# Patient Record
Sex: MALE | Race: OTHER | NOT HISPANIC OR LATINO | URBAN - METROPOLITAN AREA
[De-identification: names, ages, dates, MRNs, and addresses within clinical notes are randomized per-mention and may not be internally consistent; named-entity substitution may affect disease eponyms.]

---

## 2017-07-22 ENCOUNTER — INPATIENT (INPATIENT)
Facility: HOSPITAL | Age: 82
LOS: 2 days | Discharge: HOME | DRG: 86 | End: 2017-07-25
Attending: INTERNAL MEDICINE | Admitting: INTERNAL MEDICINE
Payer: MEDICARE

## 2017-07-22 ENCOUNTER — EMERGENCY (EMERGENCY)
Facility: HOSPITAL | Age: 82
LOS: 1 days | Discharge: ANOTHER HOSPITAL | End: 2017-07-22
Attending: EMERGENCY MEDICINE | Admitting: EMERGENCY MEDICINE
Payer: MEDICARE

## 2017-07-22 VITALS
DIASTOLIC BLOOD PRESSURE: 46 MMHG | OXYGEN SATURATION: 100 % | SYSTOLIC BLOOD PRESSURE: 102 MMHG | HEART RATE: 68 BPM | RESPIRATION RATE: 16 BRPM

## 2017-07-22 VITALS
TEMPERATURE: 98 F | SYSTOLIC BLOOD PRESSURE: 117 MMHG | WEIGHT: 130.07 LBS | DIASTOLIC BLOOD PRESSURE: 73 MMHG | OXYGEN SATURATION: 100 % | RESPIRATION RATE: 14 BRPM | HEART RATE: 67 BPM

## 2017-07-22 VITALS
SYSTOLIC BLOOD PRESSURE: 125 MMHG | TEMPERATURE: 97 F | HEART RATE: 80 BPM | RESPIRATION RATE: 18 BRPM | DIASTOLIC BLOOD PRESSURE: 62 MMHG | OXYGEN SATURATION: 99 %

## 2017-07-22 DIAGNOSIS — I10 ESSENTIAL (PRIMARY) HYPERTENSION: ICD-10-CM

## 2017-07-22 DIAGNOSIS — Z95.1 PRESENCE OF AORTOCORONARY BYPASS GRAFT: Chronic | ICD-10-CM
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
## 2017-07-22 DIAGNOSIS — E78.00 PURE HYPERCHOLESTEROLEMIA, UNSPECIFIED: ICD-10-CM

## 2017-07-22 DIAGNOSIS — R55 SYNCOPE AND COLLAPSE: ICD-10-CM

## 2017-07-22 DIAGNOSIS — I25.10 ATHEROSCLEROTIC HEART DISEASE OF NATIVE CORONARY ARTERY WITHOUT ANGINA PECTORIS: ICD-10-CM

## 2017-07-22 DIAGNOSIS — M79.652 PAIN IN LEFT THIGH: ICD-10-CM

## 2017-07-22 DIAGNOSIS — S06.5X0A TRAUMATIC SUBDURAL HEMORRHAGE WITHOUT LOSS OF CONSCIOUSNESS, INITIAL ENCOUNTER: ICD-10-CM

## 2017-07-22 DIAGNOSIS — Z79.82 LONG TERM (CURRENT) USE OF ASPIRIN: ICD-10-CM

## 2017-07-22 LAB
ABO RH CONFIRMATION: SIGNIFICANT CHANGE UP
ALBUMIN SERPL ELPH-MCNC: 3.5 G/DL — SIGNIFICANT CHANGE UP (ref 3.3–5)
ALBUMIN SERPL ELPH-MCNC: 4 G/DL — SIGNIFICANT CHANGE UP (ref 3.3–5)
ALP SERPL-CCNC: 81 U/L — SIGNIFICANT CHANGE UP (ref 40–120)
ALP SERPL-CCNC: 96 U/L — SIGNIFICANT CHANGE UP (ref 40–120)
ALT FLD-CCNC: 23 U/L RC — SIGNIFICANT CHANGE UP (ref 10–45)
ALT FLD-CCNC: 30 U/L — SIGNIFICANT CHANGE UP (ref 12–78)
ANION GAP SERPL CALC-SCNC: 10 MMOL/L — SIGNIFICANT CHANGE UP (ref 5–17)
ANION GAP SERPL CALC-SCNC: 5 MMOL/L — SIGNIFICANT CHANGE UP (ref 5–17)
APPEARANCE UR: CLEAR — SIGNIFICANT CHANGE UP
APTT BLD: 28 SEC — SIGNIFICANT CHANGE UP (ref 27.5–37.4)
AST SERPL-CCNC: 27 U/L — SIGNIFICANT CHANGE UP (ref 10–40)
AST SERPL-CCNC: 34 U/L — SIGNIFICANT CHANGE UP (ref 15–37)
BASOPHILS # BLD AUTO: 0 K/UL — SIGNIFICANT CHANGE UP (ref 0–0.2)
BASOPHILS # BLD AUTO: 0 K/UL — SIGNIFICANT CHANGE UP (ref 0–0.2)
BASOPHILS NFR BLD AUTO: 0.2 % — SIGNIFICANT CHANGE UP (ref 0–2)
BASOPHILS NFR BLD AUTO: 0.4 % — SIGNIFICANT CHANGE UP (ref 0–2)
BILIRUB SERPL-MCNC: 0.3 MG/DL — SIGNIFICANT CHANGE UP (ref 0.2–1.2)
BILIRUB SERPL-MCNC: 0.4 MG/DL — SIGNIFICANT CHANGE UP (ref 0.2–1.2)
BILIRUB UR-MCNC: NEGATIVE — SIGNIFICANT CHANGE UP
BLD GP AB SCN SERPL QL: SIGNIFICANT CHANGE UP
BUN SERPL-MCNC: 14 MG/DL — SIGNIFICANT CHANGE UP (ref 7–23)
BUN SERPL-MCNC: 14 MG/DL — SIGNIFICANT CHANGE UP (ref 7–23)
CALCIUM SERPL-MCNC: 8.8 MG/DL — SIGNIFICANT CHANGE UP (ref 8.5–10.1)
CALCIUM SERPL-MCNC: 9.2 MG/DL — SIGNIFICANT CHANGE UP (ref 8.4–10.5)
CHLORIDE SERPL-SCNC: 102 MMOL/L — SIGNIFICANT CHANGE UP (ref 96–108)
CHLORIDE SERPL-SCNC: 104 MMOL/L — SIGNIFICANT CHANGE UP (ref 96–108)
CK MB BLD-MCNC: 2.1 % — SIGNIFICANT CHANGE UP (ref 0–3.5)
CK MB CFR SERPL CALC: 1.5 NG/ML — SIGNIFICANT CHANGE UP (ref 0–3.6)
CK SERPL-CCNC: 70 U/L — SIGNIFICANT CHANGE UP (ref 26–308)
CO2 SERPL-SCNC: 23 MMOL/L — SIGNIFICANT CHANGE UP (ref 22–31)
CO2 SERPL-SCNC: 24 MMOL/L — SIGNIFICANT CHANGE UP (ref 22–31)
COLOR SPEC: YELLOW — SIGNIFICANT CHANGE UP
CREAT SERPL-MCNC: 0.99 MG/DL — SIGNIFICANT CHANGE UP (ref 0.5–1.3)
CREAT SERPL-MCNC: 1.2 MG/DL — SIGNIFICANT CHANGE UP (ref 0.5–1.3)
DIFF PNL FLD: ABNORMAL
EOSINOPHIL # BLD AUTO: 0.1 K/UL — SIGNIFICANT CHANGE UP (ref 0–0.5)
EOSINOPHIL # BLD AUTO: 0.2 K/UL — SIGNIFICANT CHANGE UP (ref 0–0.5)
EOSINOPHIL NFR BLD AUTO: 1.3 % — SIGNIFICANT CHANGE UP (ref 0–6)
EOSINOPHIL NFR BLD AUTO: 2.2 % — SIGNIFICANT CHANGE UP (ref 0–6)
GLUCOSE SERPL-MCNC: 118 MG/DL — HIGH (ref 70–99)
GLUCOSE SERPL-MCNC: 119 MG/DL — HIGH (ref 70–99)
GLUCOSE UR QL: NEGATIVE — SIGNIFICANT CHANGE UP
HCT VFR BLD CALC: 32.7 % — LOW (ref 39–50)
HCT VFR BLD CALC: 36 % — LOW (ref 39–50)
HGB BLD-MCNC: 11.3 G/DL — LOW (ref 13–17)
HGB BLD-MCNC: 12.6 G/DL — LOW (ref 13–17)
INR BLD: 1.13 RATIO — SIGNIFICANT CHANGE UP (ref 0.88–1.16)
KETONES UR-MCNC: NEGATIVE — SIGNIFICANT CHANGE UP
LEUKOCYTE ESTERASE UR-ACNC: NEGATIVE — SIGNIFICANT CHANGE UP
LYMPHOCYTES # BLD AUTO: 2.7 K/UL — SIGNIFICANT CHANGE UP (ref 1–3.3)
LYMPHOCYTES # BLD AUTO: 3.1 K/UL — SIGNIFICANT CHANGE UP (ref 1–3.3)
LYMPHOCYTES # BLD AUTO: 31.4 % — SIGNIFICANT CHANGE UP (ref 13–44)
LYMPHOCYTES # BLD AUTO: 36.4 % — SIGNIFICANT CHANGE UP (ref 13–44)
MCHC RBC-ENTMCNC: 33.2 PG — SIGNIFICANT CHANGE UP (ref 27–34)
MCHC RBC-ENTMCNC: 34.1 PG — HIGH (ref 27–34)
MCHC RBC-ENTMCNC: 34.4 GM/DL — SIGNIFICANT CHANGE UP (ref 32–36)
MCHC RBC-ENTMCNC: 34.9 GM/DL — SIGNIFICANT CHANGE UP (ref 32–36)
MCV RBC AUTO: 95.1 FL — SIGNIFICANT CHANGE UP (ref 80–100)
MCV RBC AUTO: 99.3 FL — SIGNIFICANT CHANGE UP (ref 80–100)
MONOCYTES # BLD AUTO: 0.4 K/UL — SIGNIFICANT CHANGE UP (ref 0–0.9)
MONOCYTES # BLD AUTO: 0.5 K/UL — SIGNIFICANT CHANGE UP (ref 0–0.9)
MONOCYTES NFR BLD AUTO: 5 % — SIGNIFICANT CHANGE UP (ref 2–14)
MONOCYTES NFR BLD AUTO: 5.3 % — SIGNIFICANT CHANGE UP (ref 1–9)
NEUTROPHILS # BLD AUTO: 4.3 K/UL — SIGNIFICANT CHANGE UP (ref 1.8–7.4)
NEUTROPHILS # BLD AUTO: 6.2 K/UL — SIGNIFICANT CHANGE UP (ref 1.8–7.4)
NEUTROPHILS NFR BLD AUTO: 55 % — SIGNIFICANT CHANGE UP (ref 43–77)
NEUTROPHILS NFR BLD AUTO: 62.2 % — SIGNIFICANT CHANGE UP (ref 43–77)
NITRITE UR-MCNC: NEGATIVE — SIGNIFICANT CHANGE UP
PH UR: 7 — SIGNIFICANT CHANGE UP (ref 5–8)
PLATELET # BLD AUTO: 185 K/UL — SIGNIFICANT CHANGE UP (ref 150–400)
PLATELET # BLD AUTO: 220 K/UL — SIGNIFICANT CHANGE UP (ref 150–400)
POTASSIUM SERPL-MCNC: 4.3 MMOL/L — SIGNIFICANT CHANGE UP (ref 3.5–5.3)
POTASSIUM SERPL-MCNC: 4.5 MMOL/L — SIGNIFICANT CHANGE UP (ref 3.5–5.3)
POTASSIUM SERPL-SCNC: 4.3 MMOL/L — SIGNIFICANT CHANGE UP (ref 3.5–5.3)
POTASSIUM SERPL-SCNC: 4.5 MMOL/L — SIGNIFICANT CHANGE UP (ref 3.5–5.3)
PROT SERPL-MCNC: 6.9 G/DL — SIGNIFICANT CHANGE UP (ref 6–8.3)
PROT SERPL-MCNC: 7.3 G/DL — SIGNIFICANT CHANGE UP (ref 6–8.3)
PROT UR-MCNC: NEGATIVE — SIGNIFICANT CHANGE UP
PROTHROM AB SERPL-ACNC: 12.3 SEC — SIGNIFICANT CHANGE UP (ref 9.8–12.7)
RBC # BLD: 3.3 M/UL — LOW (ref 4.2–5.8)
RBC # BLD: 3.78 M/UL — LOW (ref 4.2–5.8)
RBC # FLD: 12.9 % — SIGNIFICANT CHANGE UP (ref 10.3–14.5)
RBC # FLD: 13.3 % — SIGNIFICANT CHANGE UP (ref 10.3–14.5)
RBC CASTS # UR COMP ASSIST: ABNORMAL /HPF (ref 0–4)
SODIUM SERPL-SCNC: 133 MMOL/L — LOW (ref 135–145)
SODIUM SERPL-SCNC: 135 MMOL/L — SIGNIFICANT CHANGE UP (ref 135–145)
SP GR SPEC: 1.01 — SIGNIFICANT CHANGE UP (ref 1.01–1.02)
TROPONIN I SERPL-MCNC: <.015 NG/ML — SIGNIFICANT CHANGE UP (ref 0.01–0.04)
UROBILINOGEN FLD QL: NEGATIVE — SIGNIFICANT CHANGE UP
WBC # BLD: 7.8 K/UL — SIGNIFICANT CHANGE UP (ref 3.8–10.5)
WBC # BLD: 9.9 K/UL — SIGNIFICANT CHANGE UP (ref 3.8–10.5)
WBC # FLD AUTO: 7.8 K/UL — SIGNIFICANT CHANGE UP (ref 3.8–10.5)
WBC # FLD AUTO: 9.9 K/UL — SIGNIFICANT CHANGE UP (ref 3.8–10.5)
WBC UR QL: SIGNIFICANT CHANGE UP

## 2017-07-22 PROCEDURE — 99285 EMERGENCY DEPT VISIT HI MDM: CPT | Mod: 25

## 2017-07-22 PROCEDURE — 70450 CT HEAD/BRAIN W/O DYE: CPT | Mod: 26

## 2017-07-22 PROCEDURE — 36415 COLL VENOUS BLD VENIPUNCTURE: CPT

## 2017-07-22 PROCEDURE — 93010 ELECTROCARDIOGRAM REPORT: CPT

## 2017-07-22 PROCEDURE — 72125 CT NECK SPINE W/O DYE: CPT

## 2017-07-22 PROCEDURE — 99291 CRITICAL CARE FIRST HOUR: CPT | Mod: 25

## 2017-07-22 PROCEDURE — 80053 COMPREHEN METABOLIC PANEL: CPT

## 2017-07-22 PROCEDURE — 81001 URINALYSIS AUTO W/SCOPE: CPT

## 2017-07-22 PROCEDURE — 36416 COLLJ CAPILLARY BLOOD SPEC: CPT

## 2017-07-22 PROCEDURE — 87086 URINE CULTURE/COLONY COUNT: CPT

## 2017-07-22 PROCEDURE — 72125 CT NECK SPINE W/O DYE: CPT | Mod: 26

## 2017-07-22 PROCEDURE — 36430 TRANSFUSION BLD/BLD COMPNT: CPT

## 2017-07-22 PROCEDURE — 86901 BLOOD TYPING SEROLOGIC RH(D): CPT

## 2017-07-22 PROCEDURE — 86850 RBC ANTIBODY SCREEN: CPT

## 2017-07-22 PROCEDURE — 82553 CREATINE MB FRACTION: CPT

## 2017-07-22 PROCEDURE — 82550 ASSAY OF CK (CPK): CPT

## 2017-07-22 PROCEDURE — 96374 THER/PROPH/DIAG INJ IV PUSH: CPT | Mod: 59

## 2017-07-22 PROCEDURE — 85027 COMPLETE CBC AUTOMATED: CPT

## 2017-07-22 PROCEDURE — 71010: CPT | Mod: 26

## 2017-07-22 PROCEDURE — 99291 CRITICAL CARE FIRST HOUR: CPT

## 2017-07-22 PROCEDURE — P9037: CPT

## 2017-07-22 PROCEDURE — 86900 BLOOD TYPING SEROLOGIC ABO: CPT

## 2017-07-22 PROCEDURE — 84484 ASSAY OF TROPONIN QUANT: CPT

## 2017-07-22 PROCEDURE — 93005 ELECTROCARDIOGRAM TRACING: CPT

## 2017-07-22 PROCEDURE — 70450 CT HEAD/BRAIN W/O DYE: CPT

## 2017-07-22 PROCEDURE — 71045 X-RAY EXAM CHEST 1 VIEW: CPT

## 2017-07-22 RX ORDER — SODIUM CHLORIDE 9 MG/ML
1000 INJECTION INTRAMUSCULAR; INTRAVENOUS; SUBCUTANEOUS ONCE
Qty: 0 | Refills: 0 | Status: COMPLETED | OUTPATIENT
Start: 2017-07-22 | End: 2017-07-22

## 2017-07-22 RX ORDER — METOPROLOL TARTRATE 50 MG
1 TABLET ORAL
Qty: 0 | Refills: 0 | COMMUNITY

## 2017-07-22 RX ORDER — FUROSEMIDE 40 MG
1 TABLET ORAL
Qty: 0 | Refills: 0 | COMMUNITY

## 2017-07-22 RX ORDER — FUROSEMIDE 40 MG
40 TABLET ORAL DAILY
Qty: 0 | Refills: 0 | Status: DISCONTINUED | OUTPATIENT
Start: 2017-07-22 | End: 2017-07-22

## 2017-07-22 RX ORDER — CLOPIDOGREL BISULFATE 75 MG/1
1 TABLET, FILM COATED ORAL
Qty: 0 | Refills: 0 | COMMUNITY

## 2017-07-22 RX ORDER — LEVOTHYROXINE SODIUM 125 MCG
50 TABLET ORAL DAILY
Qty: 0 | Refills: 0 | Status: DISCONTINUED | OUTPATIENT
Start: 2017-07-22 | End: 2017-07-25

## 2017-07-22 RX ORDER — FOLIC ACID 0.8 MG
0 TABLET ORAL
Qty: 0 | Refills: 0 | COMMUNITY

## 2017-07-22 RX ORDER — SODIUM CHLORIDE 9 MG/ML
1000 INJECTION INTRAMUSCULAR; INTRAVENOUS; SUBCUTANEOUS
Qty: 0 | Refills: 0 | Status: DISCONTINUED | OUTPATIENT
Start: 2017-07-22 | End: 2017-07-24

## 2017-07-22 RX ORDER — LEVETIRACETAM 250 MG/1
1000 TABLET, FILM COATED ORAL EVERY 12 HOURS
Qty: 0 | Refills: 0 | Status: DISCONTINUED | OUTPATIENT
Start: 2017-07-22 | End: 2017-07-22

## 2017-07-22 RX ORDER — ASPIRIN/CALCIUM CARB/MAGNESIUM 324 MG
81 TABLET ORAL DAILY
Qty: 0 | Refills: 0 | Status: DISCONTINUED | OUTPATIENT
Start: 2017-07-22 | End: 2017-07-25

## 2017-07-22 RX ORDER — LEVOTHYROXINE SODIUM 125 MCG
1 TABLET ORAL
Qty: 0 | Refills: 0 | COMMUNITY

## 2017-07-22 RX ORDER — ESOMEPRAZOLE MAGNESIUM 40 MG/1
1 CAPSULE, DELAYED RELEASE ORAL
Qty: 0 | Refills: 0 | COMMUNITY

## 2017-07-22 RX ORDER — LEVETIRACETAM 250 MG/1
1000 TABLET, FILM COATED ORAL ONCE
Qty: 0 | Refills: 0 | Status: COMPLETED | OUTPATIENT
Start: 2017-07-22 | End: 2017-07-22

## 2017-07-22 RX ORDER — SODIUM CHLORIDE 9 MG/ML
1000 INJECTION INTRAMUSCULAR; INTRAVENOUS; SUBCUTANEOUS
Qty: 0 | Refills: 0 | Status: DISCONTINUED | OUTPATIENT
Start: 2017-07-22 | End: 2017-07-26

## 2017-07-22 RX ORDER — DESMOPRESSIN ACETATE 0.1 MG/1
24 TABLET ORAL ONCE
Qty: 0 | Refills: 0 | Status: COMPLETED | OUTPATIENT
Start: 2017-07-22 | End: 2017-07-22

## 2017-07-22 RX ORDER — LEVETIRACETAM 250 MG/1
500 TABLET, FILM COATED ORAL
Qty: 0 | Refills: 0 | Status: DISCONTINUED | OUTPATIENT
Start: 2017-07-22 | End: 2017-07-25

## 2017-07-22 RX ORDER — ASPIRIN/CALCIUM CARB/MAGNESIUM 324 MG
0 TABLET ORAL
Qty: 0 | Refills: 0 | COMMUNITY

## 2017-07-22 RX ORDER — SODIUM CHLORIDE 9 MG/ML
3 INJECTION INTRAMUSCULAR; INTRAVENOUS; SUBCUTANEOUS ONCE
Qty: 0 | Refills: 0 | Status: COMPLETED | OUTPATIENT
Start: 2017-07-22 | End: 2017-07-22

## 2017-07-22 RX ORDER — BICALUTAMIDE 50 MG/1
50 TABLET, FILM COATED ORAL DAILY
Qty: 0 | Refills: 0 | Status: DISCONTINUED | OUTPATIENT
Start: 2017-07-22 | End: 2017-07-25

## 2017-07-22 RX ORDER — CLOPIDOGREL BISULFATE 75 MG/1
75 TABLET, FILM COATED ORAL DAILY
Qty: 0 | Refills: 0 | Status: DISCONTINUED | OUTPATIENT
Start: 2017-07-22 | End: 2017-07-23

## 2017-07-22 RX ORDER — POTASSIUM CHLORIDE 20 MEQ
20 PACKET (EA) ORAL DAILY
Qty: 0 | Refills: 0 | Status: DISCONTINUED | OUTPATIENT
Start: 2017-07-22 | End: 2017-07-22

## 2017-07-22 RX ORDER — METOPROLOL TARTRATE 50 MG
25 TABLET ORAL
Qty: 0 | Refills: 0 | Status: DISCONTINUED | OUTPATIENT
Start: 2017-07-22 | End: 2017-07-22

## 2017-07-22 RX ORDER — ATORVASTATIN CALCIUM 80 MG/1
20 TABLET, FILM COATED ORAL AT BEDTIME
Qty: 0 | Refills: 0 | Status: DISCONTINUED | OUTPATIENT
Start: 2017-07-22 | End: 2017-07-25

## 2017-07-22 RX ORDER — ROSUVASTATIN CALCIUM 5 MG/1
1 TABLET ORAL
Qty: 0 | Refills: 0 | COMMUNITY

## 2017-07-22 RX ORDER — BICALUTAMIDE 50 MG/1
1 TABLET, FILM COATED ORAL
Qty: 0 | Refills: 0 | COMMUNITY

## 2017-07-22 RX ORDER — POTASSIUM CHLORIDE 20 MEQ
0 PACKET (EA) ORAL
Qty: 0 | Refills: 0 | COMMUNITY

## 2017-07-22 RX ADMIN — SODIUM CHLORIDE 3 MILLILITER(S): 9 INJECTION INTRAMUSCULAR; INTRAVENOUS; SUBCUTANEOUS at 11:28

## 2017-07-22 RX ADMIN — SODIUM CHLORIDE 70 MILLILITER(S): 9 INJECTION INTRAMUSCULAR; INTRAVENOUS; SUBCUTANEOUS at 21:54

## 2017-07-22 RX ADMIN — LEVETIRACETAM 400 MILLIGRAM(S): 250 TABLET, FILM COATED ORAL at 19:15

## 2017-07-22 RX ADMIN — ATORVASTATIN CALCIUM 20 MILLIGRAM(S): 80 TABLET, FILM COATED ORAL at 22:32

## 2017-07-22 RX ADMIN — SODIUM CHLORIDE 1000 MILLILITER(S): 9 INJECTION INTRAMUSCULAR; INTRAVENOUS; SUBCUTANEOUS at 11:25

## 2017-07-22 RX ADMIN — SODIUM CHLORIDE 125 MILLILITER(S): 9 INJECTION INTRAMUSCULAR; INTRAVENOUS; SUBCUTANEOUS at 13:12

## 2017-07-22 RX ADMIN — DESMOPRESSIN ACETATE 224 MICROGRAM(S): 0.1 TABLET ORAL at 13:05

## 2017-07-22 NOTE — ED PROVIDER NOTE - PHYSICAL EXAMINATION
Chin: A & O x 3, NAD, HEENT with normal pupils and no facial asymmetry; lungs with trace crackles on left, heart with reg rhythm without murmur; abdomen soft NTND; extremities with no edema; skin with no rashes, neuro exam non focal with no coordination,  motor, or sensory deficits including

## 2017-07-22 NOTE — ED ADULT NURSE NOTE - OBJECTIVE STATEMENT
89 y/o M transfer from [facility]. Patient is visiting from [place]. He fell this morning at about 08:30. Fall was not witnessed, but the family heard a thump and responded immediately. Patient takes Plavix following a cardiac bypass surgery in March 2017. CT done at [facility] and patient diagnosed with a 3mm subdural hematoma. Transferred to [facility] for Neurosurgery consult. AOx3, MAO, ambulatory.

## 2017-07-22 NOTE — H&P ADULT - NSHPPHYSICALEXAM_GEN_ALL_CORE
PHYSICAL EXAMINATION:  Vital Signs Last 24 Hrs  T(C): 36.7 (22 Jul 2017 19:15), Max: 36.9 (22 Jul 2017 14:45)  T(F): 98 (22 Jul 2017 19:15), Max: 98.4 (22 Jul 2017 14:45)  HR: 80 (22 Jul 2017 19:15) (68 - 80)  BP: 137/68 (22 Jul 2017 19:15) (102/46 - 137/68)  BP(mean): --  RR: 20 (22 Jul 2017 19:15) (16 - 20)  SpO2: 100% (22 Jul 2017 19:15) (100% - 100%)  CAPILLARY BLOOD GLUCOSE            GENERAL: NAD, well-groomed, well-developed  HEAD:  atraumatic, normocephalic  EYES: sclera anicteric  ENMT: mucous membranes moist  NECK: supple, No JVD  CHEST/LUNG: clear to auscultation bilaterally; no rales, rhonchi, or wheezing b/l  HEART: normal S1, S2  ABDOMEN: BS+, soft, ND, NT   EXTREMITIES:  pulses palpable; no clubbing, cyanosis, or edema b/l LEs  NEURO: awake, alert, interactive; moves all extremities  SKIN: no rashes or lesions

## 2017-07-22 NOTE — ED PROVIDER NOTE - NS ED ROS FT
CONST: no fevers, no chills  EYES: no pain  ENT: no sore throat   CV: no chest pain  RESP: acute on chronic cough  ABD: no abdominal pain   : no dysuria  MSK: no back pain  NEURO: no headache or additional neurologic complaints  HEME: no easy bleeding  SKIN:  no rash

## 2017-07-22 NOTE — ED PROVIDER NOTE - ATTENDING CONTRIBUTION TO CARE
pt transported s/p fall (?syncope) with 3mm SDH.   on exam neurlogically intact. gcs 15.   plan to admit to neurosx for neurochecks, rpt ct

## 2017-07-22 NOTE — ED PROVIDER NOTE - OBJECTIVE STATEMENT
s/p fall today and confused temporarily. neck pain.  pt is from [place].  pt complaining left lateral thigh pain x 15-20 days.

## 2017-07-22 NOTE — ED ADULT NURSE REASSESSMENT NOTE - NS ED NURSE REASSESS COMMENT FT1
Dr Ritter at bedside discussing results and plan of care with patient and family.   patient Dr Ritter at bedside discussing results and plan of care with patient and family.   patient resting comfortably in bed.   patient remains alert and oriented X3.   patient denies headache, blurred vision, chest pain, dizziness, nausea, shortness of breath. IV intact. respirations even and unlabored.   NSR on continuos cardiac monitor.

## 2017-07-22 NOTE — ED ADULT NURSE NOTE - CHPI ED SYMPTOMS NEG
no fever/no blurred vision/no vomiting/no nausea/no change in level of consciousness/no weakness/no dizziness/no numbness

## 2017-07-22 NOTE — H&P ADULT - ASSESSMENT
PT  WITH  SYNCOPE  TODAY,  WAS  TRANSFERRED  FROM  ANOTHER  HOSPITAL  FOR  SDH,  RPT  CT  WITHJ  3 MM S/D  HEMATOMA,.  UNCHANGED,  SEEN BY  NEUROSURG, NO  Intervention    CAD,  HTN, . CABG, TELE,  ECHO, CARD  CALLED PT  WITH  SYNCOPE  TODAY,  low  sbp,  hold  bp  meds,  iv fluids,     WAS  TRANSFERRED  FROM  ANOTHER  HOSPITAL  FOR  SDH,  RPT  CT  WITHJ   3 MM S/D  HEMATOMA,.  UNCHANGED,  SEEN BY  NEUROSURG, NO  Intervention    CAD,  HTN, . CABG, TELE,  ECHO, CARD  CALLED,  check  orthostatics

## 2017-07-22 NOTE — ED ADULT NURSE NOTE - PLAN OF CARE
Visit Information Date & Time Provider Department Dept. Phone Encounter #  
 3/7/2017 10:15 AM Javid Levine MD [facility] [address] [id number] Follow-up Instructions Return in about 6 months (around 9/7/2017). Your Appointments 3/22/2017 10:30 AM  
Office Visit with PABLITO Johnson [address]) Appt Note: est pt: 3 mo skin exam H/O:MM  
 [facility] [address]  
[address] [place] [id number] Upcoming Health Maintenance Date Due COLONOSCOPY 10/23/1970 Pneumococcal 19-64 Highest Risk (1 of 3 - PCV13) 10/23/1971 DTaP/Tdap/Td series (2 - Td) 11/24/2024 Allergies as of 3/7/2017  Review Complete On: 3/7/2017 By: Javid Levine MD  
 No Known Allergies Current Immunizations  Reviewed on 3/7/2017 Name Date Influenza High Dose Vaccine PF 10/5/2016 Influenza Vaccine 11/24/2015, 10/1/2015 Tdap 11/24/2014 Zoster Vaccine, Live 1/21/2016 Reviewed by Javid Levine MD on 3/7/2017 at 10:29 AM  
You Were Diagnosed With   
  
 Codes Comments Preventative health care    -  Primary ICD-10-CM: Z00.00 ICD-9-CM: V70.0 Essential hypertension     ICD-10-CM: I10 
ICD-9-CM: 401.9 Pure hypercholesterolemia     ICD-10-CM: E78.00 ICD-9-CM: 272.0 Other secondary osteoarthritis of left knee     ICD-10-CM: M17.5 Hx of adenomatous colonic polyps     ICD-10-CM: Z86.010 
ICD-9-CM: V12.72 Vitals BP Pulse Temp Resp Height(growth percentile) Weight(growth percentile) 111/67 (BP 1 Location: Left arm, BP Patient Position: Sitting) 62 98.7 °F (37.1 °C) (Oral) 18 6' 1\" (1.854 m) 329 lb 2 oz (149.3 kg) SpO2 BMI Smoking Status 98% 43.42 kg/m2 Never Smoker Vitals History BMI and BSA Data Body Mass Index Body Surface Area 43.42 kg/m 2 2.77 m 2 Preferred Pharmacy Pharmacy Name Phone [id number] - [place] - [address] [phone] Your Updated Medication List  
  
   
This list is accurate as of: 3/7/17 10:45 AM.  Always use your most recent med list.  
  
  
  
  
 allopurinol 300 mg tablet Commonly known as:  Ardie Fleischer Take 1 Tab by mouth daily. atorvastatin 10 mg tablet Commonly known as:  LIPITOR Take 10 mg by mouth nightly. calcium 500 mg Tab Take  by mouth. With potassium  
  
 cholecalciferol (vitamin D3) 2,000 unit Tab Take  by mouth.  
  
 econazole nitrate 1 % topical cream  
Commonly known as:  SPECTAZOLE  
two (2) times a day. As needed  
  
 furosemide 20 mg tablet Commonly known as:  LASIX Take 20 mg by mouth daily. indomethacin SR 75 mg SR capsule Commonly known as:  INDOCIN SR Take 75 mg by mouth daily as needed. magnesium 250 mg Tab Take  by mouth. Omega-3 Fatty Acids 300 mg Cap Take  by mouth.  
  
 triamcinolone acetonide 0.1 % ointment Commonly known as:  KENALOG Apply  to affected area two (2) times a day. use thin layer  
  
 valsartan 160 mg tablet Commonly known as:  DIOVAN Take 160 mg by mouth daily. verapamil  mg CR tablet Commonly known as:  CALAN-SR Take 240 mg by mouth two (2) times a day. We Performed the Following LIPID PANEL [08223 CPT(R)] METABOLIC PANEL, BASIC [68796 CPT(R)] PROSTATE SPECIFIC AG (PSA) [id number] CPT(R)] Follow-up Instructions Return in about 6 months (around 9/7/2017). Patient Instructions Well Visit, Men 48 to 72: Care Instructions Your Care Instructions Physical exams can help you stay healthy. Your doctor has checked your overall health and may have suggested ways to take good care of yourself. He or she also may have recommended tests.  At home, you can help prevent illness with healthy eating, regular exercise, and other steps. Follow-up care is a key part of your treatment and safety. Be sure to make and go to all appointments, and call your doctor if you are having problems. It's also a good idea to know your test results and keep a list of the medicines you take. How can you care for yourself at home? · Reach and stay at a healthy weight. This will lower your risk for many problems, such as obesity, diabetes, heart disease, and high blood pressure. · Get at least 30 minutes of exercise on most days of the week. Walking is a good choice. You also may want to do other activities, such as running, swimming, cycling, or playing tennis or team sports. · Do not smoke. Smoking can make health problems worse. If you need help quitting, talk to your doctor about stop-smoking programs and medicines. These can increase your chances of quitting for good. · Protect your skin from too much sun. When you're outdoors from 10 a.m. to 4 p.m., stay in the shade or cover up with clothing and a hat with a wide brim. Wear sunglasses that block UV rays. Even when it's cloudy, put broad-spectrum sunscreen (SPF 30 or higher) on any exposed skin. · See a dentist one or two times a year for checkups and to have your teeth cleaned. · Wear a seat belt in the car. · Limit alcohol to 2 drinks a day. Too much alcohol can cause health problems. Follow your doctor's advice about when to have certain tests. These tests can spot problems early. · Cholesterol. Your doctor will tell you how often to have this done based on your overall health and other things that can increase your risk for heart attack and stroke. · Blood pressure. Have your blood pressure checked during a routine doctor visit. Your doctor will tell you how often to check your blood pressure based on your age, your blood pressure results, and other factors.  
· Prostate exam. Talk to your doctor about whether you should have a blood test (called a PSA test) for prostate cancer. Experts disagree on whether men should have this test. Some experts recommend that you discuss the benefits and risks of the test with your doctor. · Diabetes. Ask your doctor whether you should have tests for diabetes. · Vision. Some experts recommend that you have yearly exams for glaucoma and other age-related eye problems starting at age 48. · Hearing. Tell your doctor if you notice any change in your hearing. You can have tests to find out how well you hear. · Colon cancer. You should begin tests for colon cancer at age 48. You may have one of several tests. Your doctor will tell you how often to have tests based on your age and risk. Risks include whether you already had a precancerous polyp removed from your colon or whether your parent, brother, sister, or child has had colon cancer. · Heart attack and stroke risk. At least every 4 to 6 years, you should have your risk for heart attack and stroke assessed. Your doctor uses factors such as your age, blood pressure, cholesterol, and whether you smoke or have diabetes to show what your risk for a heart attack or stroke is over the next 10 years. · Abdominal aortic aneurysm. Ask your doctor whether you should have a test to check for an aneurysm. You may need a test if you ever smoked or if your parent, brother, sister, or child has had an aneurysm. When should you call for help? Watch closely for changes in your health, and be sure to contact your doctor if you have any problems or symptoms that concern you. Where can you learn more? Go to http://juliana-samy.info/. Enter C392 in the search box to learn more about \"Well Visit, Men 48 to 72: Care Instructions. \" Current as of: July 19, 2016 Content Version: 11.1 © 1384-8333 Protection Plus.  Care instructions adapted under license by [employer] (which disclaims liability or warranty for this information). If you have questions about a medical condition or this instruction, always ask your healthcare professional. [facility] [address] any warranty or liability for your use of this information. Introducing [facility] SERVICES! Umesh Davis introduces Dodreams patient portal. Now you can access parts of your medical record, email your doctor's office, and request medication refills online. 1. In your internet browser, go to [email or web address] [email or web address] 2. Click on the First Time User? Click Here link in the Sign In box. You will see the New Member Sign Up page. 3. Enter your Dodreams Access Code exactly as it appears below. You will not need to use this code after youve completed the sign-up process. If you do not sign up before the expiration date, you must request a new code. · Dodreams Access Code: [email or web address] Expires: 6/5/2017 10:43 AM 
 
4. Enter the last four digits of your Social Security Number (xxxx) and Date of Birth (mm/dd/yyyy) as indicated and click Submit. You will be taken to the next sign-up page. 5. Create a Dodreams ID. This will be your Dodreams login ID and cannot be changed, so think of one that is secure and easy to remember. 6. Create a Dodreams password. You can change your password at any time. 7. Enter your Password Reset Question and Answer. This can be used at a later time if you forget your password. 8. Enter your e-mail address. You will receive e-mail notification when new information is available in [address] 9. Click Sign Up. You can now view and download portions of your medical record. 10. Click the Download Summary menu link to download a portable copy of your medical information. If you have questions, please visit the Frequently Asked Questions section of the Dodreams website. Remember, Dodreams is NOT to be used for urgent needs. For medical emergencies, dial 911. Now available from your iPhone and Android! Please provide this summary of care documentation to your next provider. Your primary care clinician is listed as Ravi Andres. If you have any questions after today's visit, please call [phone]. Explanation of exam/test/Fall precautions/Call bell

## 2017-07-22 NOTE — ED PROVIDER NOTE - OBJECTIVE STATEMENT
88 year old, on plavix for cardiac disease, presenting by transfer from [place] for subdural 3mm after fall this morning in bathroom while brushing his teeth. patient doesn't remember the events of the fall which is abnormal for him. he used to get frequent syncopes before his bipass surgery in March in [place]. 88 year old, on plavix and aspirin for cardiac disease, presenting by transfer from [place] for subdural 3mm after fall this morning in bathroom while brushing his teeth. patient doesn't remember the events of the fall which is abnormal for him. he used to get frequent syncopes before his bipass surgery in March in [place]. no acute complaints    PMD: none  (visiting) 88 year old, on plavix and aspirin for cardiac disease, presenting by transfer from [place] for subdural 3mm after fall this morning in bathroom while brushing his teeth. patient doesn't remember the events of the fall which is abnormal for him. he used to get frequent syncopes before his bipass surgery in March in [place]. no acute complaints. First troponin normal at outside hospital    PMD: none  (visiting) 88 year old, on plavix and aspirin for cardiac disease, presenting by transfer from [place] for subdural 3mm after fall this morning in bathroom while brushing his teeth. patient doesn't remember the events of the fall which is abnormal for him. he used to get frequent syncopes before his bipass surgery in March in [place]. no acute complaints. First troponin normal at outside hospital.     PMD: none  (visiting) 88 year old, on plavix and aspirin for cardiac disease, presenting by transfer from [place] for subdural 3mm after fall this morning in bathroom while brushing his teeth. patient doesn't remember the events of the fall which is abnormal for him. he used to get frequent syncopes before his bypass surgery in March in [place]. no acute complaints. First troponin normal at outside hospital.     PMD: none  (visiting)

## 2017-07-22 NOTE — ED ADULT NURSE REASSESSMENT NOTE - NS ED NURSE REASSESS COMMENT FT1
report given to transfer center [facility].  consent received by daughter of patient, Dr Ritter discussed the risk and benefit, daughter demonstrated verbal udnerstnading. report given to transfer center car[facility].  consent received by daughter of patient for transfer and blood consent, Dr Ritter discussed the risk and benefit, daughter demonstrated verbal udnerstnading.

## 2017-07-22 NOTE — ED PROVIDER NOTE - CRITICAL CARE PROVIDED
consultation with other physicians/documentation/direct patient care (not related to procedure)/additional history taking/consult w/ pt's family directly relating to pts condition/interpretation of diagnostic studies

## 2017-07-22 NOTE — H&P ADULT - HISTORY OF PRESENT ILLNESS
: 88 year old, on plavix and aspirin for cardiac disease, presenting by transfer from [place] for subdural 3mm after fall this morning in bathroom while brushing his teeth. patient doesn't remember the events of the fall which is abnormal for him. he used to get frequent syncopes b,  seen  by  neurosurgery  in  [place],  no  intervention,  rpt  ct  head,  no  cahnge  in  SDH  PMD: none  (visiting)	  88 year old, on plavix and aspirin for cardiac disease, presenting by transfer from [place] for subdural 3mm after fall this morning in bathroom while brushing his teeth. patient doesn't remember the events of the fall which is abnormal for him. he used to get frequent syncopes before his bipass surgery in March in [place]. no acute complaints. First troponin normal at outside hospital  PMD: none  (visiting)	    PAST MEDICAL/SURGICAL/FAMILY/SOCIAL HISTORY:   Past Medical History:  CAD (coronary artery disease)    HLD (hyperlipidemia)

## 2017-07-22 NOTE — ED ADULT NURSE NOTE - OBJECTIVE STATEMENT
87yo male presents to ED alert and oriented X4. granddaughter at bedside.  granddaughter heard patient fall at home and found patient awake on the floor. patient does not remember how he fell.   unaware if patient experienced loss of consciousness.  patient is currently alert and oriented X4. patient denies headache, blurred vision, chest pain, shortness of breath, palpitations.   clear speech. full range of motion. equal and strong hand grasp. negative arm/ leg drfit.  ekg performed by geraldine and reviewed by Dr. Ritter.  NSR on Interview Rocket cardiac monitor.

## 2017-07-22 NOTE — H&P ADULT - NSHPLABSRESULTS_GEN_ALL_CORE
LABS:                        11.3   9.9   )-----------( 220      ( 22 Jul 2017 15:27 )             32.7     07-22    135  |  102  |  14  ----------------------------<  119<H>  4.3   |  23  |  0.99    Ca    9.2      22 Jul 2017 15:27    TPro  6.9  /  Alb  4.0  /  TBili  0.3  /  DBili  x   /  AST  27  /  ALT  23  /  AlkPhos  81  07-22    PT/INR - ( 22 Jul 2017 15:27 )   PT: 12.3 sec;   INR: 1.13 ratio         PTT - ( 22 Jul 2017 15:27 )  PTT:28.0 sec        RADIOLOGY & ADDITIONAL TESTS:

## 2017-07-22 NOTE — ED ADULT NURSE REASSESSMENT NOTE - NS ED NURSE REASSESS COMMENT FT1
transfusion complete at this time. transfer [facility] paramedics at bedside.   patient remains alert and oriented X3.   patient denies chills, headache, blurred vision, chest pain, dizziness, nausea, shortness of breath. IV intact. respirations even and unlabored.   NSR on continuos cardiac monitor.

## 2017-07-22 NOTE — ED ADULT NURSE REASSESSMENT NOTE - EENT WDL
Eyes with no visual disturbances.  Ears clean and dry and no hearing difficulties. Nose with pink mucosa and no drainage.  Mouth mucous membranes moist and pink.  No tenderness or swelling to throat or neck.

## 2017-07-23 LAB
ANION GAP SERPL CALC-SCNC: 14 MMOL/L — SIGNIFICANT CHANGE UP (ref 5–17)
BUN SERPL-MCNC: 12 MG/DL — SIGNIFICANT CHANGE UP (ref 7–23)
CALCIUM SERPL-MCNC: 9.4 MG/DL — SIGNIFICANT CHANGE UP (ref 8.4–10.5)
CHLORIDE SERPL-SCNC: 103 MMOL/L — SIGNIFICANT CHANGE UP (ref 96–108)
CO2 SERPL-SCNC: 17 MMOL/L — LOW (ref 22–31)
CREAT SERPL-MCNC: 0.81 MG/DL — SIGNIFICANT CHANGE UP (ref 0.5–1.3)
GLUCOSE SERPL-MCNC: 82 MG/DL — SIGNIFICANT CHANGE UP (ref 70–99)
HCT VFR BLD CALC: 29.8 % — LOW (ref 39–50)
HGB BLD-MCNC: 10 G/DL — LOW (ref 13–17)
MCHC RBC-ENTMCNC: 31.7 PG — SIGNIFICANT CHANGE UP (ref 27–34)
MCHC RBC-ENTMCNC: 33.6 GM/DL — SIGNIFICANT CHANGE UP (ref 32–36)
MCV RBC AUTO: 94.6 FL — SIGNIFICANT CHANGE UP (ref 80–100)
PLATELET # BLD AUTO: 212 K/UL — SIGNIFICANT CHANGE UP (ref 150–400)
POTASSIUM SERPL-MCNC: 3.5 MMOL/L — SIGNIFICANT CHANGE UP (ref 3.5–5.3)
POTASSIUM SERPL-SCNC: 3.5 MMOL/L — SIGNIFICANT CHANGE UP (ref 3.5–5.3)
RBC # BLD: 3.15 M/UL — LOW (ref 4.2–5.8)
RBC # FLD: 14.5 % — SIGNIFICANT CHANGE UP (ref 10.3–14.5)
SODIUM SERPL-SCNC: 134 MMOL/L — LOW (ref 135–145)
TSH SERPL-MCNC: 0.32 UIU/ML — SIGNIFICANT CHANGE UP (ref 0.27–4.2)
WBC # BLD: 7.09 K/UL — SIGNIFICANT CHANGE UP (ref 3.8–10.5)
WBC # FLD AUTO: 7.09 K/UL — SIGNIFICANT CHANGE UP (ref 3.8–10.5)

## 2017-07-23 RX ORDER — BRIMONIDINE TARTRATE, TIMOLOL MALEATE 2; 5 MG/ML; MG/ML
1 SOLUTION/ DROPS OPHTHALMIC
Qty: 0 | Refills: 0 | Status: DISCONTINUED | OUTPATIENT
Start: 2017-07-23 | End: 2017-07-25

## 2017-07-23 RX ORDER — BRINZOLAMIDE 10 MG/ML
1 SUSPENSION/ DROPS OPHTHALMIC THREE TIMES A DAY
Qty: 0 | Refills: 0 | Status: DISCONTINUED | OUTPATIENT
Start: 2017-07-23 | End: 2017-07-25

## 2017-07-23 RX ADMIN — ATORVASTATIN CALCIUM 20 MILLIGRAM(S): 80 TABLET, FILM COATED ORAL at 21:07

## 2017-07-23 RX ADMIN — LEVETIRACETAM 500 MILLIGRAM(S): 250 TABLET, FILM COATED ORAL at 17:23

## 2017-07-23 RX ADMIN — Medication 50 MICROGRAM(S): at 05:59

## 2017-07-23 RX ADMIN — BRINZOLAMIDE 1 DROP(S): 10 SUSPENSION/ DROPS OPHTHALMIC at 21:10

## 2017-07-23 RX ADMIN — SODIUM CHLORIDE 70 MILLILITER(S): 9 INJECTION INTRAMUSCULAR; INTRAVENOUS; SUBCUTANEOUS at 21:10

## 2017-07-23 RX ADMIN — LEVETIRACETAM 500 MILLIGRAM(S): 250 TABLET, FILM COATED ORAL at 05:59

## 2017-07-23 RX ADMIN — SODIUM CHLORIDE 70 MILLILITER(S): 9 INJECTION INTRAMUSCULAR; INTRAVENOUS; SUBCUTANEOUS at 11:49

## 2017-07-23 RX ADMIN — BICALUTAMIDE 50 MILLIGRAM(S): 50 TABLET, FILM COATED ORAL at 11:49

## 2017-07-23 RX ADMIN — Medication 81 MILLIGRAM(S): at 11:49

## 2017-07-23 RX ADMIN — BRIMONIDINE TARTRATE, TIMOLOL MALEATE 1 DROP(S): 2; 5 SOLUTION/ DROPS OPHTHALMIC at 17:58

## 2017-07-23 NOTE — PROGRESS NOTE ADULT - SUBJECTIVE AND OBJECTIVE BOX
SUBJECTIVE / OVERNIGHT EVENTS: No nausea, vomiting or diarrhea, no fever or chills, no dizziness or chest pain, no dysuria or hematuria .      CAPILLARY BLOOD GLUCOSE        I&O's Summary    22 Jul 2017 07:01  -  23 Jul 2017 07:00  --------------------------------------------------------  IN: 730 mL / OUT: 550 mL / NET: 180 mL        PHYSICAL EXAM:  HEAD:  Atraumatic, Normocephalic  EYES: EOMI, PERRLA, conjunctiva and sclera clear  NECK: Supple, No JVD  CHEST/LUNG: Clear to auscultation bilaterally; No wheeze  HEART: Regular rate and rhythm; No murmurs, rubs, or gallops  ABDOMEN: Soft, Nontender, Nondistended; Bowel sounds present  EXTREMITIES:  2+ Peripheral Pulses, No clubbing, cyanosis, or edema  PSYCH: AAOx3  NEUROLOGY: non-focal  SKIN: No rashes or lesions    MEDICATIONS  (STANDING):  aspirin enteric coated 81 milliGRAM(s) Oral daily  atorvastatin 20 milliGRAM(s) Oral at bedtime  bicalutamide 50 milliGRAM(s) Oral daily  clopidogrel Tablet 75 milliGRAM(s) Oral daily  levothyroxine 50 MICROGram(s) Oral daily  levETIRAcetam 500 milliGRAM(s) Oral two times a day  sodium chloride 0.9%. 1000 milliLiter(s) (70 mL/Hr) IV Continuous <Continuous>    MEDICATIONS  (PRN):      LABS:                        10.0   7.09  )-----------( 212      ( 23 Jul 2017 08:56 )             29.8     07-22    135  |  102  |  14  ----------------------------<  119<H>  4.3   |  23  |  0.99    Ca    9.2      22 Jul 2017 15:27    TPro  6.9  /  Alb  4.0  /  TBili  0.3  /  DBili  x   /  AST  27  /  ALT  23  /  AlkPhos  81  07-22    PT/INR - ( 22 Jul 2017 15:27 )   PT: 12.3 sec;   INR: 1.13 ratio         PTT - ( 22 Jul 2017 15:27 )  PTT:28.0 sec  CARDIAC MARKERS ( 22 Jul 2017 15:27 )  x     / <0.01 ng/mL / 49 U/L / x     / 2.5 ng/mL                    Cultures:    EKG:    Radiological Studies:    Consultant(s) Notes Reviewed:      Care Discussed with Consultants/Other Providers:

## 2017-07-24 DIAGNOSIS — E87.1 HYPO-OSMOLALITY AND HYPONATREMIA: ICD-10-CM

## 2017-07-24 LAB
ANION GAP SERPL CALC-SCNC: 10 MMOL/L — SIGNIFICANT CHANGE UP (ref 5–17)
BUN SERPL-MCNC: 9 MG/DL — SIGNIFICANT CHANGE UP (ref 7–23)
CALCIUM SERPL-MCNC: 9 MG/DL — SIGNIFICANT CHANGE UP (ref 8.4–10.5)
CHLORIDE SERPL-SCNC: 100 MMOL/L — SIGNIFICANT CHANGE UP (ref 96–108)
CHLORIDE UR-SCNC: 154 MMOL/L — SIGNIFICANT CHANGE UP
CO2 SERPL-SCNC: 18 MMOL/L — LOW (ref 22–31)
CREAT ?TM UR-MCNC: 46 MG/DL — SIGNIFICANT CHANGE UP
CREAT SERPL-MCNC: 0.75 MG/DL — SIGNIFICANT CHANGE UP (ref 0.5–1.3)
CULTURE RESULTS: NO GROWTH — SIGNIFICANT CHANGE UP
FERRITIN SERPL-MCNC: 356 NG/ML — SIGNIFICANT CHANGE UP (ref 30–400)
GLUCOSE SERPL-MCNC: 95 MG/DL — SIGNIFICANT CHANGE UP (ref 70–99)
HCT VFR BLD CALC: 31.4 % — LOW (ref 39–50)
HGB BLD-MCNC: 10.6 G/DL — LOW (ref 13–17)
IRON SATN MFR SERPL: 28 % — SIGNIFICANT CHANGE UP (ref 16–55)
IRON SATN MFR SERPL: 50 UG/DL — SIGNIFICANT CHANGE UP (ref 45–165)
MCHC RBC-ENTMCNC: 31.4 PG — SIGNIFICANT CHANGE UP (ref 27–34)
MCHC RBC-ENTMCNC: 33.8 GM/DL — SIGNIFICANT CHANGE UP (ref 32–36)
MCV RBC AUTO: 92.9 FL — SIGNIFICANT CHANGE UP (ref 80–100)
OSMOLALITY UR: 480 MOS/KG — SIGNIFICANT CHANGE UP (ref 50–1200)
PLATELET # BLD AUTO: 184 K/UL — SIGNIFICANT CHANGE UP (ref 150–400)
POTASSIUM SERPL-MCNC: 3.5 MMOL/L — SIGNIFICANT CHANGE UP (ref 3.5–5.3)
POTASSIUM SERPL-SCNC: 3.5 MMOL/L — SIGNIFICANT CHANGE UP (ref 3.5–5.3)
POTASSIUM UR-SCNC: 23 MMOL/L — SIGNIFICANT CHANGE UP
RBC # BLD: 3.38 M/UL — LOW (ref 4.2–5.8)
RBC # FLD: 14 % — SIGNIFICANT CHANGE UP (ref 10.3–14.5)
SODIUM SERPL-SCNC: 128 MMOL/L — LOW (ref 135–145)
SODIUM UR-SCNC: 161 MMOL/L — SIGNIFICANT CHANGE UP
SPECIMEN SOURCE: SIGNIFICANT CHANGE UP
TIBC SERPL-MCNC: 180 UG/DL — LOW (ref 220–430)
UIBC SERPL-MCNC: 130 UG/DL — SIGNIFICANT CHANGE UP (ref 110–370)
WBC # BLD: 6.86 K/UL — SIGNIFICANT CHANGE UP (ref 3.8–10.5)
WBC # FLD AUTO: 6.86 K/UL — SIGNIFICANT CHANGE UP (ref 3.8–10.5)

## 2017-07-24 PROCEDURE — 93306 TTE W/DOPPLER COMPLETE: CPT | Mod: 26

## 2017-07-24 PROCEDURE — 99222 1ST HOSP IP/OBS MODERATE 55: CPT | Mod: GC

## 2017-07-24 RX ORDER — SENNA PLUS 8.6 MG/1
2 TABLET ORAL AT BEDTIME
Qty: 0 | Refills: 0 | Status: DISCONTINUED | OUTPATIENT
Start: 2017-07-24 | End: 2017-07-25

## 2017-07-24 RX ORDER — LATANOPROST 0.05 MG/ML
1 SOLUTION/ DROPS OPHTHALMIC; TOPICAL AT BEDTIME
Qty: 0 | Refills: 0 | Status: DISCONTINUED | OUTPATIENT
Start: 2017-07-24 | End: 2017-07-25

## 2017-07-24 RX ORDER — FOLIC ACID 0.8 MG
1 TABLET ORAL DAILY
Qty: 0 | Refills: 0 | Status: DISCONTINUED | OUTPATIENT
Start: 2017-07-24 | End: 2017-07-25

## 2017-07-24 RX ORDER — METOPROLOL TARTRATE 50 MG
25 TABLET ORAL
Qty: 0 | Refills: 0 | Status: DISCONTINUED | OUTPATIENT
Start: 2017-07-24 | End: 2017-07-25

## 2017-07-24 RX ADMIN — BRINZOLAMIDE 1 DROP(S): 10 SUSPENSION/ DROPS OPHTHALMIC at 06:08

## 2017-07-24 RX ADMIN — Medication 50 MICROGRAM(S): at 06:00

## 2017-07-24 RX ADMIN — LATANOPROST 1 DROP(S): 0.05 SOLUTION/ DROPS OPHTHALMIC; TOPICAL at 21:44

## 2017-07-24 RX ADMIN — SENNA PLUS 2 TABLET(S): 8.6 TABLET ORAL at 21:44

## 2017-07-24 RX ADMIN — ATORVASTATIN CALCIUM 20 MILLIGRAM(S): 80 TABLET, FILM COATED ORAL at 21:44

## 2017-07-24 RX ADMIN — SODIUM CHLORIDE 70 MILLILITER(S): 9 INJECTION INTRAMUSCULAR; INTRAVENOUS; SUBCUTANEOUS at 06:08

## 2017-07-24 RX ADMIN — BRIMONIDINE TARTRATE, TIMOLOL MALEATE 1 DROP(S): 2; 5 SOLUTION/ DROPS OPHTHALMIC at 06:08

## 2017-07-24 RX ADMIN — BRINZOLAMIDE 1 DROP(S): 10 SUSPENSION/ DROPS OPHTHALMIC at 21:44

## 2017-07-24 RX ADMIN — LEVETIRACETAM 500 MILLIGRAM(S): 250 TABLET, FILM COATED ORAL at 06:00

## 2017-07-24 NOTE — CONSULT NOTE ADULT - PROBLEM SELECTOR RECOMMENDATION 9
- Na trending down from admission: 135 to 128  - does not appear fluid overloaded or hypovolemic  - would f/u serum and urine osm as well  - f/u TSH (pt w/ hx of hypothyroid)  - place on 1L fluid restriction  - can start on salt tabs 2g TID  - monitor BMP daily

## 2017-07-24 NOTE — PHYSICAL THERAPY INITIAL EVALUATION ADULT - ADDITIONAL COMMENTS
Lives with family in [place], is staying with daughter and her family while here in [place]. Prior to admission pt was independent with all ADL, ambulated without a device, no stairs to negotiate.

## 2017-07-24 NOTE — CONSULT NOTE ADULT - ASSESSMENT
Keppra 500 mg BID 7 days  q4 neuro check, repeat CTH 4 hours, if CTH stable fu outpatient  trauma eval appreciated
Pt is an 88 year old M on ASA and Plavix, CAD s/p CABG, HLD, hypothyroid, transferred from [place] for a 3mm subdural hematoma s/p syncope/fall. No NSx intervention at this time.    Nephrology called for hyponatremia.

## 2017-07-24 NOTE — PHYSICAL THERAPY INITIAL EVALUATION ADULT - PERTINENT HX OF CURRENT PROBLEM, REHAB EVAL
89 yo M transferred from [place] for 3 mm subdural after fall in bathroom while brushing his teeth. Pt doesn't remember the events of the fall, reports frequent syncopes. Seen  by  neurosurgery in  ED, no intervention, rpt CT head, no change in SDH. CTH: Small acute anterior interhemispheric subdural hematoma, unchanged from prior study.

## 2017-07-24 NOTE — PROGRESS NOTE ADULT - SUBJECTIVE AND OBJECTIVE BOX
- Patient seen and examined.  - In summary, patient is a 88y year old man who presented with fall. (22 Jul 2017 20:41)  - Today, patient is without complaints.         *****MEDICATIONS:    MEDICATIONS  (STANDING):  aspirin enteric coated 81 milliGRAM(s) Oral daily  atorvastatin 20 milliGRAM(s) Oral at bedtime  bicalutamide 50 milliGRAM(s) Oral daily  levothyroxine 50 MICROGram(s) Oral daily  levETIRAcetam 500 milliGRAM(s) Oral two times a day  brimonidine 0.2%/ timolol 0.5% Ophthalmic Solution 1 Drop(s) Both EYES two times a day  brinzolamide 1% Ophthalmic Suspension 1 Drop(s) Both EYES three times a day  metoprolol 25 milliGRAM(s) Oral two times a day    MEDICATIONS  (PRN):           ***** REVIEW OF SYSTEM:  GEN: no fever, no chills, no pain  RESP: no SOB, no cough, no sputum  CVS: no chest pain, no palpitations, no edema  GI: no abdominal pain, no nausea, no vomiting, no constipation, no diarrhea  : no dysurea, no frequency  NEURO: no headache, no diziness  PSYCH: no depression, not anxious  Derm : no itching, no rash         ***** VITAL SIGNS:  T(F): 97.4 (07-24-17 @ 08:45), Max: 98.2 (07-23-17 @ 20:13)  HR: 72 (07-24-17 @ 08:45) (66 - 97)  BP: 165/77 (07-24-17 @ 08:45) (123/66 - 165/77)  RR: 18 (07-24-17 @ 08:45) (18 - 20)  SpO2: 97% (07-24-17 @ 08:45) (97% - 100%)  Wt(kg): --  ,   I&O's Summary    23 Jul 2017 07:01  -  24 Jul 2017 07:00  --------------------------------------------------------  IN: 1760 mL / OUT: 700 mL / NET: 1060 mL             *****PHYSICAL EXAM:  GEN: A&O X 3 , NAD , comfortable  HEENT: NCAT, EOMI, MMM, no icterus  NECK: Supple, No JVD  CVS: S1S2 , regular , No M/R/G appreciated  PULM: CTA B/L,  no W/R/R appreciated  ABD.: soft. non tender, non distended,  bowel sounds present  Extrem: intact pulses , no edema noted  Derm: No rash or ecchymosis noted  PSYCH: normal mood, no depression, not anxious         *****LAB AND IMAGING:                        10.6   6.86  )-----------( 184      ( 24 Jul 2017 06:59 )             31.4               07-24    128<L>  |  100  |  9   ----------------------------<  95  3.5   |  18<L>  |  0.75    Ca    9.0      24 Jul 2017 06:59    TPro  6.9  /  Alb  4.0  /  TBili  0.3  /  DBili  x   /  AST  27  /  ALT  23  /  AlkPhos  81  07-22    PT/INR - ( 22 Jul 2017 15:27 )   PT: 12.3 sec;   INR: 1.13 ratio         PTT - ( 22 Jul 2017 15:27 )  PTT:28.0 sec       CARDIAC MARKERS ( 22 Jul 2017 15:27 )  x     / <0.01 ng/mL / 49 U/L / x     / 2.5 ng/mL                [All pertinent recent Imaging/Reports reviewed]         *****A S S E S S M E N T   A N D   P L A N :    88M fall vs syncope, SDH, recent CABG   no NSx  intervention planned  AED as Rx  recent cabg, no stents- per daughter   echo pending  consider DC plavix   asa 81 as long as no objection per nsx  PT eval pending      __________________________  DM Castillo D.O.

## 2017-07-24 NOTE — CONSULT NOTE ADULT - SUBJECTIVE AND OBJECTIVE BOX
[facility] DIVISION OF KIDNEY DISEASES AND HYPERTENSION -- FOLLOW UP NOTE  --------------------------------------------------------------------------------  Chief Complaint: hyponatremia    24 hour events/subjective: Pt feeling constipated, and says he has some 'stomach problem." Says he needs his nexium and folic acid.        PAST HISTORY  --------------------------------------------------------------------------------  No significant changes to PMH, PSH, FHx, SHx, unless otherwise noted    ALLERGIES & MEDICATIONS  --------------------------------------------------------------------------------  Allergies    No Known Allergies    Intolerances      Standing Inpatient Medications  aspirin enteric coated 81 milliGRAM(s) Oral daily  atorvastatin 20 milliGRAM(s) Oral at bedtime  bicalutamide 50 milliGRAM(s) Oral daily  levothyroxine 50 MICROGram(s) Oral daily  levETIRAcetam 500 milliGRAM(s) Oral two times a day  brimonidine 0.2%/ timolol 0.5% Ophthalmic Solution 1 Drop(s) Both EYES two times a day  brinzolamide 1% Ophthalmic Suspension 1 Drop(s) Both EYES three times a day  metoprolol 25 milliGRAM(s) Oral two times a day  senna 2 Tablet(s) Oral at bedtime  folic acid 1 milliGRAM(s) Oral daily  latanoprost 0.005% Ophthalmic Solution 1 Drop(s) Both EYES at bedtime    PRN Inpatient Medications      REVIEW OF SYSTEMS  --------------------------------------------------------------------------------  Gen: no fatigue, fevers/chills, weakness  Skin: No rashes  Respiratory: No dyspnea, cough, wheezing, hemoptysis  CV: No chest pain, PND, orthopnea  GI: No abdominal pain, diarrhea, +constipation  : No dysuria, hematuria  MSK: No joint pain/swelling; no edema  Neuro: no confusion    VITALS/PHYSICAL EXAM  --------------------------------------------------------------------------------  T(C): 36.6 (07-24-17 @ 16:22), Max: 36.8 (07-23-17 @ 20:13)  HR: 78 (07-24-17 @ 16:22) (67 - 78)  BP: 159/79 (07-24-17 @ 16:22) (143/67 - 165/77)  RR: 19 (07-24-17 @ 16:22) (18 - 20)  SpO2: 99% (07-24-17 @ 16:22) (97% - 100%)  Wt(kg): --  Height (cm): 160.02 (07-22-17 @ 21:35)  Weight (kg): 64 (07-22-17 @ 21:35)  BMI (kg/m2): 25 (07-22-17 @ 21:35)  BSA (m2): 1.67 (07-22-17 @ 21:35)      07-23-17 @ 07:01  -  07-24-17 @ 07:00  --------------------------------------------------------  IN: 1760 mL / OUT: 700 mL / NET: 1060 mL    07-24-17 @ 07:01  -  07-24-17 @ 20:12  --------------------------------------------------------  IN: 730 mL / OUT: 400 mL / NET: 330 mL      Physical Exam:  	Gen: NAD, well-appearing elderly male; hard of hearing  	HEENT: supple neck; moist oral mucosa  	Pulm: CTA B/L  	CV: RRR, S1S2; no rub  	Back: No spinal or CVA tenderness; no sacral edema  	Abd: +BS, soft, nontender/nondistended  	: No suprapubic tenderness  	UE: Warm, FROM, no clubbing, intact strength; no edema  	LE: Warm, FROM, no clubbing, intact strength; no edema  	Neuro: No focal deficits  	Psych: Normal affect and mood  	Skin: Warm, without rashes  	  LABS/STUDIES  --------------------------------------------------------------------------------              10.6   6.86  >-----------<  184      [07-24-17 @ 06:59]              31.4     128  |  100  |  9   ----------------------------<  95      [07-24-17 @ 06:59]  3.5   |  18  |  0.75        Ca     9.0     [07-24-17 @ 06:59]            Creatinine Trend:  SCr 0.75 [07-24 @ 06:59]  SCr 0.81 [07-23 @ 08:35]  SCr 0.99 [07-22 @ 15:27]      Urine Creatinine 46      [07-24-17 @ 16:51]  Urine Sodium 161      [07-24-17 @ 16:51]  Urine Potassium 23      [07-24-17 @ 16:51]  Urine Chloride 154      [07-24-17 @ 16:51]    Iron 50, TIBC 180, %sat 28      [07-24-17 @ 06:59]  Ferritin 356.0      [07-24-17 @ 06:59]  TSH 0.32      [07-23-17 @ 08:42]
p (1527)     HPI: Jazzy Morrow 88 year old male wiht history of CAD, HLP, cardiac stents (patients states "several years ago," CABG (march) presents to the ED after mechanical fall with brief LOC according to daughter.  Patient is on ASA 81 and plavix.  He was taken to [place] where he was reversed with DDAVP and 1 unit of platelets.  Imaging showed an anterior interhemisipheric  3mm SDH.     PAST MEDICAL HISTORY   CAD (coronary artery disease)  HLD (hyperlipidemia)  Hypothyroid    PAST SURGICAL HISTORY   S/P CABG (coronary artery bypass graft)        MEDICATIONS:  Antibiotics:    Neuro:    Anticoagulation:    Other:      SOCIAL HISTORY:   Occupation:   Marital Status:     FAMILY HISTORY:      REVIEW OF SYSTEMS:  Check here if all are normal other than Neurological []  General:  Eyes:  ENT:  Cardiac:  Respiratory:  GI:  Musculoskeletal:   Skin:  Neurologic:   Psychiatric:     PHYSICAL EXAMINATION:   T(C): 36.9 (07-22-17 @ 14:45), Max: 36.9 (07-22-17 @ 14:45)  HR: 80 (07-22-17 @ 14:45) (68 - 80)  BP: 131/74 (07-22-17 @ 14:45) (102/46 - 131/74)  RR: 20 (07-22-17 @ 14:45) (16 - 20)  SpO2: 100% (07-22-17 @ 14:45) (100% - 100%)  Wt(kg): --    General Examination:   AOx3, FC, PERRL, EOMI, V1-3 intact, no facial, palate lucille symmetric, tongue midline, shrug 5/5  5/5 throughout, no drift  SILT  No clonus or babinski    Neurologic Examination:             Higher functions                 Normal [x]               Abnormal:      Cranial Nerves (ii-xii)           Normal [x]              Abnormal:     Motor Exam                       Normal [x]              Abnormal:                               Sensory Exam                   Normal [x]              Abnormal:    Reflexes                            Normal [x]              Abnormal:     Coordination                      Normal [x]              Abnormal:    Other:     LABS:                        11.3   9.9   )-----------( 220      ( 22 Jul 2017 15:27 )             32.7     07-22    135  |  102  |  14  ----------------------------<  119<H>  4.3   |  23  |  0.99    Ca    9.2      22 Jul 2017 15:27    TPro  6.9  /  Alb  4.0  /  TBili  0.3  /  DBili  x   /  AST  27  /  ALT  23  /  AlkPhos  81  07-22    PT/INR - ( 22 Jul 2017 15:27 )   PT: 12.3 sec;   INR: 1.13 ratio         PTT - ( 22 Jul 2017 15:27 )  PTT:28.0 sec      RADIOLOGY & ADDITIONAL STUDIES:  CTH: per HPI  CT c spine: negative
Patient is a 88y old  Male who presents with a chief complaint of fall.(22 Jul 2017 20:41)    HPI:  : 88 year old, on plavix and aspirin for cardiac disease, presenting by transfer from [place] for subdural 3mm after fall this morning in bathroom while brushing his teeth. patient doesn't remember the events of the fall which is abnormal for him. he used to get frequent syncopes b,  seen  by  neurosurgery  in  [place],  no  intervention,  rpt  ct  head,  no  cahnge  in  SDH  PMD: none  (visiting)	  88 year old, on plavix and aspirin for cardiac disease, presenting by transfer from [place] for subdural 3mm after fall this morning in bathroom while brushing his teeth. patient doesn't remember the events of the fall which is abnormal for him. he used to get frequent syncopes before his bipass surgery in March in [place]. no acute complaints. First troponin normal at outside hospital  PMD: none  (visiting)	    PAST MEDICAL/SURGICAL/FAMILY/SOCIAL HISTORY:   Past Medical History:  CAD (coronary artery disease)    HLD (hyperlipidemia) (22 Jul 2017 20:41)           *****PAST MEDICAL / Surgical  HISTORY:  PAST MEDICAL & SURGICAL HISTORY:  CAD (coronary artery disease)  HLD (hyperlipidemia)  Hypothyroid  S/P CABG (coronary artery bypass graft)           *****FAMILY HISTORY:  FAMILY HISTORY:           *****SOCIAL HISTORY:  Alcohol: None  Smoking: None         *****ALLERGIES:   Allergies    No Known Allergies    Intolerances             *****MEDICATIONS:  MEDICATIONS  (STANDING):  aspirin enteric coated 81 milliGRAM(s) Oral daily  atorvastatin 20 milliGRAM(s) Oral at bedtime  bicalutamide 50 milliGRAM(s) Oral daily  clopidogrel Tablet 75 milliGRAM(s) Oral daily  levothyroxine 50 MICROGram(s) Oral daily  levETIRAcetam 500 milliGRAM(s) Oral two times a day  sodium chloride 0.9%. 1000 milliLiter(s) (70 mL/Hr) IV Continuous <Continuous>    MEDICATIONS  (PRN):           *****REVIEW OF SYSTEM:  GEN: no fever, no chills, no pain  RESP: no SOB, no cough, no sputum  CVS: no chest pain, no palpitations, no edema  GI: no abdominal pain, no nausea, no vomiting, no constipation, no diarrhea  : no dysurea, no frequency, no hematurea  Neuro: no headache, no dizziness  PSYCH: no anxiety, no depression  Derm : no itching, no rash         *****VITAL SIGNS:  T(C): 36.6 (07-23-17 @ 04:26), Max: 36.9 (07-22-17 @ 14:45)  HR: 76 (07-23-17 @ 04:26) (68 - 83)  BP: 117/65 (07-23-17 @ 04:26) (102/46 - 137/73)  RR: 17 (07-23-17 @ 04:26) (16 - 20)  SpO2: 97% (07-23-17 @ 04:26) (97% - 100%)  Wt(kg): --    07-22 @ 07:01  -  07-23 @ 07:00  --------------------------------------------------------  IN: 730 mL / OUT: 550 mL / NET: 180 mL             *****PHYSICAL EXAM:  GEN: A&O X 3 , NAD , comfortable  HEENT: NCAT, PERRL, MMM, hearing intact  Neck: supple , no JVD  CVS: S1S2 , regular , No M/R/G appreciated  PULM: CTA B/L,  no W/R/R appreciated  ABD.: soft. non tender, non distended,  bowel sounds present  Extrem: intact pulses , no edema   Derm: No rash , no ecchymoses  PSYCH : normal mood,  no delusion not anxious         *****LAB AND IMAGING:                          10.0   7.09  )-----------( 212      ( 23 Jul 2017 08:56 )             29.8               07-22    135  |  102  |  14  ----------------------------<  119<H>  4.3   |  23  |  0.99    Ca    9.2      22 Jul 2017 15:27    TPro  6.9  /  Alb  4.0  /  TBili  0.3  /  DBili  x   /  AST  27  /  ALT  23  /  AlkPhos  81  07-22    PT/INR - ( 22 Jul 2017 15:27 )   PT: 12.3 sec;   INR: 1.13 ratio         PTT - ( 22 Jul 2017 15:27 )  PTT:28.0 sec            CARDIAC MARKERS ( 22 Jul 2017 15:27 )  x     / <0.01 ng/mL / 49 U/L / x     / 2.5 ng/mL                  [All pertinent recent Imaging reports reviewed]         *****A S S E S S M E N T   A N D   P L A N :  88M fall vs syncope, SDH, recent CABG   NSx eval noted  no intervention planned  AED  recent cabg, no stents- per daughter   echo pending  will consider DC plavix after echo  cont asa 81 as long as no objection per nsx  PT eval              ___________________________  Will follow with you.  Thank you,  DM Castillo D.O.

## 2017-07-25 ENCOUNTER — EMERGENCY (EMERGENCY)
Facility: HOSPITAL | Age: 82
LOS: 1 days | Discharge: ANOTHER HOSPITAL | End: 2017-07-25
Attending: EMERGENCY MEDICINE | Admitting: EMERGENCY MEDICINE
Payer: MEDICARE

## 2017-07-25 VITALS
TEMPERATURE: 98 F | RESPIRATION RATE: 18 BRPM | HEART RATE: 74 BPM | SYSTOLIC BLOOD PRESSURE: 133 MMHG | DIASTOLIC BLOOD PRESSURE: 76 MMHG | OXYGEN SATURATION: 96 %

## 2017-07-25 VITALS
DIASTOLIC BLOOD PRESSURE: 64 MMHG | SYSTOLIC BLOOD PRESSURE: 97 MMHG | HEART RATE: 71 BPM | RESPIRATION RATE: 20 BRPM | TEMPERATURE: 98 F | WEIGHT: 149.91 LBS | OXYGEN SATURATION: 97 %

## 2017-07-25 DIAGNOSIS — I25.10 ATHEROSCLEROTIC HEART DISEASE OF NATIVE CORONARY ARTERY WITHOUT ANGINA PECTORIS: ICD-10-CM

## 2017-07-25 DIAGNOSIS — R55 SYNCOPE AND COLLAPSE: ICD-10-CM

## 2017-07-25 DIAGNOSIS — R56.9 UNSPECIFIED CONVULSIONS: ICD-10-CM

## 2017-07-25 DIAGNOSIS — Z79.01 LONG TERM (CURRENT) USE OF ANTICOAGULANTS: ICD-10-CM

## 2017-07-25 DIAGNOSIS — E03.9 HYPOTHYROIDISM, UNSPECIFIED: ICD-10-CM

## 2017-07-25 DIAGNOSIS — E78.5 HYPERLIPIDEMIA, UNSPECIFIED: ICD-10-CM

## 2017-07-25 DIAGNOSIS — Z79.82 LONG TERM (CURRENT) USE OF ASPIRIN: ICD-10-CM

## 2017-07-25 DIAGNOSIS — Z95.1 PRESENCE OF AORTOCORONARY BYPASS GRAFT: Chronic | ICD-10-CM

## 2017-07-25 LAB
ALBUMIN SERPL ELPH-MCNC: 3.3 G/DL — SIGNIFICANT CHANGE UP (ref 3.3–5)
ALP SERPL-CCNC: 97 U/L — SIGNIFICANT CHANGE UP (ref 40–120)
ALT FLD-CCNC: 24 U/L — SIGNIFICANT CHANGE UP (ref 12–78)
ANION GAP SERPL CALC-SCNC: 17 MMOL/L — SIGNIFICANT CHANGE UP (ref 5–17)
ANION GAP SERPL CALC-SCNC: 8 MMOL/L — SIGNIFICANT CHANGE UP (ref 5–17)
APTT BLD: 28.9 SEC — SIGNIFICANT CHANGE UP (ref 27.5–37.4)
AST SERPL-CCNC: 19 U/L — SIGNIFICANT CHANGE UP (ref 15–37)
BASOPHILS # BLD AUTO: 0.1 K/UL — SIGNIFICANT CHANGE UP (ref 0–0.2)
BASOPHILS NFR BLD AUTO: 0.8 % — SIGNIFICANT CHANGE UP (ref 0–2)
BILIRUB SERPL-MCNC: 0.4 MG/DL — SIGNIFICANT CHANGE UP (ref 0.2–1.2)
BUN SERPL-MCNC: 13 MG/DL — SIGNIFICANT CHANGE UP (ref 7–23)
BUN SERPL-MCNC: 23 MG/DL — SIGNIFICANT CHANGE UP (ref 7–23)
CALCIUM SERPL-MCNC: 8.5 MG/DL — SIGNIFICANT CHANGE UP (ref 8.5–10.1)
CALCIUM SERPL-MCNC: 9.2 MG/DL — SIGNIFICANT CHANGE UP (ref 8.4–10.5)
CHLORIDE SERPL-SCNC: 101 MMOL/L — SIGNIFICANT CHANGE UP (ref 96–108)
CHLORIDE SERPL-SCNC: 97 MMOL/L — SIGNIFICANT CHANGE UP (ref 96–108)
CO2 SERPL-SCNC: 16 MMOL/L — LOW (ref 22–31)
CO2 SERPL-SCNC: 24 MMOL/L — SIGNIFICANT CHANGE UP (ref 22–31)
CREAT SERPL-MCNC: 0.83 MG/DL — SIGNIFICANT CHANGE UP (ref 0.5–1.3)
CREAT SERPL-MCNC: 1.5 MG/DL — HIGH (ref 0.5–1.3)
EOSINOPHIL # BLD AUTO: 0.2 K/UL — SIGNIFICANT CHANGE UP (ref 0–0.5)
EOSINOPHIL NFR BLD AUTO: 2.2 % — SIGNIFICANT CHANGE UP (ref 0–6)
GLUCOSE SERPL-MCNC: 107 MG/DL — HIGH (ref 70–99)
GLUCOSE SERPL-MCNC: 128 MG/DL — HIGH (ref 70–99)
HCT VFR BLD CALC: 34.6 % — LOW (ref 39–50)
HCT VFR BLD CALC: 35.6 % — LOW (ref 39–50)
HGB BLD-MCNC: 11.9 G/DL — LOW (ref 13–17)
HGB BLD-MCNC: 12.3 G/DL — LOW (ref 13–17)
INR BLD: 1.21 RATIO — HIGH (ref 0.88–1.16)
LYMPHOCYTES # BLD AUTO: 3.5 K/UL — HIGH (ref 1–3.3)
LYMPHOCYTES # BLD AUTO: 36.3 % — SIGNIFICANT CHANGE UP (ref 13–44)
MCHC RBC-ENTMCNC: 31.6 PG — SIGNIFICANT CHANGE UP (ref 27–34)
MCHC RBC-ENTMCNC: 32.4 PG — SIGNIFICANT CHANGE UP (ref 27–34)
MCHC RBC-ENTMCNC: 34.4 GM/DL — SIGNIFICANT CHANGE UP (ref 32–36)
MCHC RBC-ENTMCNC: 34.5 GM/DL — SIGNIFICANT CHANGE UP (ref 32–36)
MCV RBC AUTO: 91.8 FL — SIGNIFICANT CHANGE UP (ref 80–100)
MCV RBC AUTO: 94 FL — SIGNIFICANT CHANGE UP (ref 80–100)
MONOCYTES # BLD AUTO: 0.7 K/UL — SIGNIFICANT CHANGE UP (ref 0–0.9)
MONOCYTES NFR BLD AUTO: 7.4 % — SIGNIFICANT CHANGE UP (ref 1–9)
NEUTROPHILS # BLD AUTO: 5.2 K/UL — SIGNIFICANT CHANGE UP (ref 1.8–7.4)
NEUTROPHILS NFR BLD AUTO: 53.4 % — SIGNIFICANT CHANGE UP (ref 43–77)
PLATELET # BLD AUTO: 225 K/UL — SIGNIFICANT CHANGE UP (ref 150–400)
PLATELET # BLD AUTO: 247 K/UL — SIGNIFICANT CHANGE UP (ref 150–400)
POTASSIUM SERPL-MCNC: 3.7 MMOL/L — SIGNIFICANT CHANGE UP (ref 3.5–5.3)
POTASSIUM SERPL-MCNC: 3.9 MMOL/L — SIGNIFICANT CHANGE UP (ref 3.5–5.3)
POTASSIUM SERPL-SCNC: 3.7 MMOL/L — SIGNIFICANT CHANGE UP (ref 3.5–5.3)
POTASSIUM SERPL-SCNC: 3.9 MMOL/L — SIGNIFICANT CHANGE UP (ref 3.5–5.3)
PROT SERPL-MCNC: 6.9 G/DL — SIGNIFICANT CHANGE UP (ref 6–8.3)
PROTHROM AB SERPL-ACNC: 13.2 SEC — HIGH (ref 9.8–12.7)
RBC # BLD: 3.77 M/UL — LOW (ref 4.2–5.8)
RBC # BLD: 3.79 M/UL — LOW (ref 4.2–5.8)
RBC # FLD: 12.8 % — SIGNIFICANT CHANGE UP (ref 10.3–14.5)
RBC # FLD: 14.3 % — SIGNIFICANT CHANGE UP (ref 10.3–14.5)
SODIUM SERPL-SCNC: 130 MMOL/L — LOW (ref 135–145)
SODIUM SERPL-SCNC: 133 MMOL/L — LOW (ref 135–145)
TROPONIN I SERPL-MCNC: 0.03 NG/ML — SIGNIFICANT CHANGE UP (ref 0.01–0.04)
WBC # BLD: 8.02 K/UL — SIGNIFICANT CHANGE UP (ref 3.8–10.5)
WBC # BLD: 9.7 K/UL — SIGNIFICANT CHANGE UP (ref 3.8–10.5)
WBC # FLD AUTO: 8.02 K/UL — SIGNIFICANT CHANGE UP (ref 3.8–10.5)
WBC # FLD AUTO: 9.7 K/UL — SIGNIFICANT CHANGE UP (ref 3.8–10.5)

## 2017-07-25 PROCEDURE — 82553 CREATINE MB FRACTION: CPT

## 2017-07-25 PROCEDURE — 82550 ASSAY OF CK (CPK): CPT

## 2017-07-25 PROCEDURE — 82728 ASSAY OF FERRITIN: CPT

## 2017-07-25 PROCEDURE — 83935 ASSAY OF URINE OSMOLALITY: CPT

## 2017-07-25 PROCEDURE — 96374 THER/PROPH/DIAG INJ IV PUSH: CPT

## 2017-07-25 PROCEDURE — 85610 PROTHROMBIN TIME: CPT

## 2017-07-25 PROCEDURE — 82570 ASSAY OF URINE CREATININE: CPT

## 2017-07-25 PROCEDURE — 99285 EMERGENCY DEPT VISIT HI MDM: CPT | Mod: 25

## 2017-07-25 PROCEDURE — 84443 ASSAY THYROID STIM HORMONE: CPT

## 2017-07-25 PROCEDURE — 83550 IRON BINDING TEST: CPT

## 2017-07-25 PROCEDURE — 93306 TTE W/DOPPLER COMPLETE: CPT

## 2017-07-25 PROCEDURE — 97161 PT EVAL LOW COMPLEX 20 MIN: CPT

## 2017-07-25 PROCEDURE — 70450 CT HEAD/BRAIN W/O DYE: CPT

## 2017-07-25 PROCEDURE — 84300 ASSAY OF URINE SODIUM: CPT

## 2017-07-25 PROCEDURE — 93005 ELECTROCARDIOGRAM TRACING: CPT

## 2017-07-25 PROCEDURE — 85027 COMPLETE CBC AUTOMATED: CPT

## 2017-07-25 PROCEDURE — 80048 BASIC METABOLIC PNL TOTAL CA: CPT

## 2017-07-25 PROCEDURE — 85730 THROMBOPLASTIN TIME PARTIAL: CPT

## 2017-07-25 PROCEDURE — 82436 ASSAY OF URINE CHLORIDE: CPT

## 2017-07-25 PROCEDURE — 84133 ASSAY OF URINE POTASSIUM: CPT

## 2017-07-25 PROCEDURE — 84484 ASSAY OF TROPONIN QUANT: CPT

## 2017-07-25 PROCEDURE — 80053 COMPREHEN METABOLIC PANEL: CPT

## 2017-07-25 RX ORDER — SODIUM CHLORIDE 9 MG/ML
3 INJECTION INTRAMUSCULAR; INTRAVENOUS; SUBCUTANEOUS ONCE
Qty: 0 | Refills: 0 | Status: COMPLETED | OUTPATIENT
Start: 2017-07-25 | End: 2017-07-25

## 2017-07-25 RX ORDER — LEVETIRACETAM 250 MG/1
1 TABLET, FILM COATED ORAL
Qty: 8 | Refills: 0 | OUTPATIENT
Start: 2017-07-25 | End: 2017-07-29

## 2017-07-25 RX ORDER — LATANOPROST 0.05 MG/ML
1 SOLUTION/ DROPS OPHTHALMIC; TOPICAL
Qty: 0 | Refills: 0 | COMMUNITY
Start: 2017-07-25

## 2017-07-25 RX ORDER — SODIUM CHLORIDE 9 MG/ML
1000 INJECTION INTRAMUSCULAR; INTRAVENOUS; SUBCUTANEOUS ONCE
Qty: 0 | Refills: 0 | Status: COMPLETED | OUTPATIENT
Start: 2017-07-25 | End: 2017-07-25

## 2017-07-25 RX ORDER — BRIMONIDINE TARTRATE, TIMOLOL MALEATE 2; 5 MG/ML; MG/ML
1 SOLUTION/ DROPS OPHTHALMIC
Qty: 0 | Refills: 0 | COMMUNITY
Start: 2017-07-25

## 2017-07-25 RX ORDER — SENNA PLUS 8.6 MG/1
2 TABLET ORAL
Qty: 0 | Refills: 0 | COMMUNITY
Start: 2017-07-25

## 2017-07-25 RX ADMIN — Medication 50 MICROGRAM(S): at 05:15

## 2017-07-25 RX ADMIN — LEVETIRACETAM 500 MILLIGRAM(S): 250 TABLET, FILM COATED ORAL at 05:15

## 2017-07-25 RX ADMIN — Medication 81 MILLIGRAM(S): at 12:58

## 2017-07-25 RX ADMIN — Medication 25 MILLIGRAM(S): at 05:15

## 2017-07-25 RX ADMIN — BRIMONIDINE TARTRATE, TIMOLOL MALEATE 1 DROP(S): 2; 5 SOLUTION/ DROPS OPHTHALMIC at 05:15

## 2017-07-25 RX ADMIN — BRINZOLAMIDE 1 DROP(S): 10 SUSPENSION/ DROPS OPHTHALMIC at 05:15

## 2017-07-25 NOTE — PROGRESS NOTE ADULT - SUBJECTIVE AND OBJECTIVE BOX
SUBJECTIVE / OVERNIGHT EVENTS: No nausea, vomiting or diarrhea, no fever or chills, no dizziness or chest pain, no dysuria or hematuria .      CAPILLARY BLOOD GLUCOSE        I&O's Summary    24 Jul 2017 07:01  -  25 Jul 2017 07:00  --------------------------------------------------------  IN: 730 mL / OUT: 1400 mL / NET: -670 mL        PHYSICAL EXAM:  HEAD:  Atraumatic, Normocephalic  EYES: EOMI, PERRLA, conjunctiva and sclera clear  NECK: Supple, No JVD  CHEST/LUNG: Clear to auscultation bilaterally; No wheeze  HEART: Regular rate and rhythm; No murmurs, rubs, or gallops  ABDOMEN: Soft, Nontender, Nondistended; Bowel sounds present  EXTREMITIES:  2+ Peripheral Pulses, No clubbing, cyanosis, or edema  PSYCH: AAOx3  NEUROLOGY: non-focal  SKIN: No rashes or lesions    MEDICATIONS  (STANDING):  aspirin enteric coated 81 milliGRAM(s) Oral daily  atorvastatin 20 milliGRAM(s) Oral at bedtime  bicalutamide 50 milliGRAM(s) Oral daily  levothyroxine 50 MICROGram(s) Oral daily  levETIRAcetam 500 milliGRAM(s) Oral two times a day  brimonidine 0.2%/ timolol 0.5% Ophthalmic Solution 1 Drop(s) Both EYES two times a day  brinzolamide 1% Ophthalmic Suspension 1 Drop(s) Both EYES three times a day  metoprolol 25 milliGRAM(s) Oral two times a day  senna 2 Tablet(s) Oral at bedtime  folic acid 1 milliGRAM(s) Oral daily  latanoprost 0.005% Ophthalmic Solution 1 Drop(s) Both EYES at bedtime    MEDICATIONS  (PRN):      LABS:                        11.9   8.02  )-----------( 225      ( 25 Jul 2017 07:41 )             34.6     07-25    130<L>  |  97  |  13  ----------------------------<  107<H>  3.7   |  16<L>  |  0.83    Ca    9.2      25 Jul 2017 07:49                      Thyroid Stimulating Hormone, Serum: 0.32 uIU/mL (07-23 @ 08:42)      Cultures:    EKG:    Radiological Studies:    Consultant(s) Notes Reviewed:      Care Discussed with Consultants/Other Providers:

## 2017-07-25 NOTE — PROGRESS NOTE ADULT - SUBJECTIVE AND OBJECTIVE BOX
- Patient seen and examined.  - In summary, patient is a 88y year old man who presented with fall. (2017 20:41)  - Today, patient is without complaints.         *****MEDICATIONS:    MEDICATIONS  (STANDING):  aspirin enteric coated 81 milliGRAM(s) Oral daily  atorvastatin 20 milliGRAM(s) Oral at bedtime  bicalutamide 50 milliGRAM(s) Oral daily  levothyroxine 50 MICROGram(s) Oral daily  levETIRAcetam 500 milliGRAM(s) Oral two times a day  brimonidine 0.2%/ timolol 0.5% Ophthalmic Solution 1 Drop(s) Both EYES two times a day  brinzolamide 1% Ophthalmic Suspension 1 Drop(s) Both EYES three times a day  metoprolol 25 milliGRAM(s) Oral two times a day  senna 2 Tablet(s) Oral at bedtime  folic acid 1 milliGRAM(s) Oral daily  latanoprost 0.005% Ophthalmic Solution 1 Drop(s) Both EYES at bedtime    MEDICATIONS  (PRN):         ***** REVIEW OF SYSTEM:  GEN: no fever, no chills, no pain  RESP: no SOB, no cough, no sputum  CVS: no chest pain, no palpitations, no edema  GI: no abdominal pain, no nausea, no vomiting, no constipation, no diarrhea  : no dysurea, no frequency  NEURO: no headache, no diziness  PSYCH: no depression, not anxious  Derm : no itching, no rash         ***** VITAL SIGNS:    T(F): 98.4 (17 @ 08:08), Max: 98.4 (17 @ 08:08)  HR: 75 (17 @ 08:08) (75 - 96)  BP: 149/78 (17 @ 08:08) (149/78 - 164/84)  RR: 18 (17 @ 08:08) (18 - 19)  SpO2: 99% (17 @ 08:08) (98% - 99%)  Wt(kg): --  ,   I&O's Summary    2017 07:01  -  2017 07:00  --------------------------------------------------------  IN: 730 mL / OUT: 1400 mL / NET: -670 mL               *****PHYSICAL EXAM:  GEN: A&O X 3 , NAD , comfortable  HEENT: NCAT, EOMI, MMM, no icterus  NECK: Supple, No JVD  CVS: S1S2 , regular , No M/R/G appreciated  PULM: CTA B/L,  no W/R/R appreciated  ABD.: soft. non tender, non distended,  bowel sounds present  Extrem: intact pulses , no edema noted  Derm: No rash or ecchymosis noted  PSYCH: normal mood, no depression, not anxious         *****LAB AND IMAGIN.9   8.02  )-----------( 225      ( 2017 07:41 )             34.6               07-25    130<L>  |  97  |  13  ----------------------------<  107<H>  3.7   |  16<L>  |  0.83    Ca    9.2      2017 07:49              [All pertinent recent Imaging/Reports reviewed]      < from: Transthoracic Echocardiogram (17 @ 20:48) >  Conclusions:  1. Calcified trileaflet aortic valve with decreased  opening. Peak transaortic valve gradient equals 23 mm Hg,  mean transaortic valve gradient equals 7 mm Hg, aortic  valve velocity time integral equals 50 cm.  2. Increased relative wall thickness with normal left  ventricular mass index, consistent with concentric left  ventricular remodeling.  3. Normal left ventricular systolic function. No segmental  wall motion abnormalities.  *** No previous Echo exam.    < end of copied text >         *****A S S E S S M E N T   A N D   P L A N :    88M fall vs syncope, SDH, recent CABG   no NSx  intervention planned  AED as Rx  recent cabg, no stents- per daughter   echo as noted  DC plavix   asa 81 as long as no objection per nsx  PT eval pending  f/u renal  dcp when clear by renal    __________________________  DM Castillo D.O.

## 2017-07-25 NOTE — DISCHARGE NOTE ADULT - PATIENT PORTAL LINK FT
“You can access the [employer] Patient Portal, offered by [facility], by registering with the following website: [email or web address]”

## 2017-07-25 NOTE — DISCHARGE NOTE ADULT - ADDITIONAL INSTRUCTIONS
please make an appointment with your PCP (1wk), and neurologist (2~4wks) after discharge.  f/u with out patient PT.

## 2017-07-25 NOTE — PROGRESS NOTE ADULT - SUBJECTIVE AND OBJECTIVE BOX
[facility] DIVISION OF KIDNEY DISEASES AND HYPERTENSION -- FOLLOW UP NOTE  --------------------------------------------------------------------------------  Chief Complaint: fall, hyponatremia    24 hour events/subjective: Feeling well.         PAST HISTORY  --------------------------------------------------------------------------------  No significant changes to PMH, PSH, FHx, SHx, unless otherwise noted    ALLERGIES & MEDICATIONS  --------------------------------------------------------------------------------  Allergies    No Known Allergies    Intolerances      Standing Inpatient Medications  aspirin enteric coated 81 milliGRAM(s) Oral daily  atorvastatin 20 milliGRAM(s) Oral at bedtime  bicalutamide 50 milliGRAM(s) Oral daily  levothyroxine 50 MICROGram(s) Oral daily  levETIRAcetam 500 milliGRAM(s) Oral two times a day  brimonidine 0.2%/ timolol 0.5% Ophthalmic Solution 1 Drop(s) Both EYES two times a day  brinzolamide 1% Ophthalmic Suspension 1 Drop(s) Both EYES three times a day  metoprolol 25 milliGRAM(s) Oral two times a day  senna 2 Tablet(s) Oral at bedtime  folic acid 1 milliGRAM(s) Oral daily  latanoprost 0.005% Ophthalmic Solution 1 Drop(s) Both EYES at bedtime    PRN Inpatient Medications      REVIEW OF SYSTEMS  --------------------------------------------------------------------------------  Gen: no fatigue, fevers/chills, weakness  Skin: No rashes  Respiratory: No dyspnea, cough, wheezing, hemoptysis  CV: No chest pain, PND, orthopnea  GI: No abdominal pain, diarrhea, constipation, nausea, vomiting  : No dysuria, hematuria  MSK: No joint pain/swelling; no edema  Neuro: no confusion    VITALS/PHYSICAL EXAM  --------------------------------------------------------------------------------  T(C): 36.9 (07-25-17 @ 12:03), Max: 36.9 (07-25-17 @ 08:08)  HR: 74 (07-25-17 @ 12:03) (74 - 96)  BP: 133/76 (07-25-17 @ 12:03) (133/76 - 164/84)  RR: 18 (07-25-17 @ 12:03) (18 - 19)  SpO2: 96% (07-25-17 @ 12:03) (96% - 99%)  Wt(kg): --        07-24-17 @ 07:01  -  07-25-17 @ 07:00  --------------------------------------------------------  IN: 730 mL / OUT: 1400 mL / NET: -670 mL    07-25-17 @ 07:01  -  07-25-17 @ 12:44  --------------------------------------------------------  IN: 240 mL / OUT: 0 mL / NET: 240 mL      Physical Exam:  	Gen: NAD, well-appearing elderly male, lying in bed  	HEENT: supple neck; moist oral mucosa  	Pulm: CTA B/L  	CV: RRR, S1S2; no rub  	Back: No spinal or CVA tenderness; no sacral edema  	Abd: +BS, soft, nontender/nondistended  	: No suprapubic tenderness  	UE: Warm, FROM, no clubbing, intact strength; no edema  	LE: Warm, FROM, no clubbing, intact strength; no edema  	Neuro: No focal deficits  	Psych: Normal affect and mood  	Skin: Warm, without rashes  	  LABS/STUDIES  --------------------------------------------------------------------------------              11.9   8.02  >-----------<  225      [07-25-17 @ 07:41]              34.6     130  |  97  |  13  ----------------------------<  107      [07-25-17 @ 07:49]  3.7   |  16  |  0.83        Ca     9.2     [07-25-17 @ 07:49]            Creatinine Trend:  SCr 0.83 [07-25 @ 07:49]  SCr 0.75 [07-24 @ 06:59]  SCr 0.81 [07-23 @ 08:35]  SCr 0.99 [07-22 @ 15:27]      Urine Creatinine 46      [07-24-17 @ 16:51]  Urine Sodium 161      [07-24-17 @ 16:51]  Urine Potassium 23      [07-24-17 @ 16:51]  Urine Chloride 154      [07-24-17 @ 16:51]  Urine Osmolality 480      [07-24-17 @ 16:51]    Iron 50, TIBC 180, %sat 28      [07-24-17 @ 06:59]  Ferritin 356.0      [07-24-17 @ 06:59]  TSH 0.32      [07-23-17 @ 08:42]

## 2017-07-25 NOTE — DISCHARGE NOTE ADULT - HOSPITAL COURSE
FALL,  WITH   SUBDURAL  HEMATOMA,  RPT CT  WITH IMPROVEMENT  SEN BY NEURO  SURG,  ECHO, NORMAL;, D C HOME  TODAY, PMD, 1 WEEK

## 2017-07-25 NOTE — PROGRESS NOTE ADULT - ASSESSMENT
Pt is an 88 year old M on ASA and Plavix, CAD s/p CABG, HLD, hypothyroid, transferred from [place] for a 3mm subdural hematoma s/p syncope/fall. No NSx intervention at this time.    Nephrology called for hyponatremia.

## 2017-07-25 NOTE — DISCHARGE NOTE ADULT - PLAN OF CARE
stable status post echo; EF 55%, normal left ventricular function.  HOME CARE INSTRUCTIONS  Have someone stay with you until you feel stable.  Do not drive, operate machinery, or play sports until your caregiver says it is okay.  Keep all follow-up appointments as directed by your caregiver.   Lie down right away if you start feeling like you might faint. Breathe deeply and steadily. Wait until all the symptoms have passed.Drink enough fluids to keep your urine clear or pale yellow.  If you are taking blood pressure or heart medicine, get up slowly, taking several minutes to sit and then stand. This can reduce dizziness.  SEEK IMMEDIATE MEDICAL CARE IF:  You have a severe headache.  You have unusual pain in the chest, abdomen, or back.  You are bleeding from the mouth or rectum, or you have black or tarry stool.  You have an irregular or very fast heartbeat.  You have pain with breathing.  You have repeated fainting or seizure-like jerking during an episode.  You faint when sitting or lying down.  You have confusion.  You have difficulty walking.  You have severe weakness.  You have vision problems. stable. today's sodium 130 and normal urine lytes.  restriction of daily fluid 1 L.  f/u with your primary doctor in 1 wk to repeat BMP Coronary artery disease is a condition where the arteries the supply the heart muscle get clogges with fatty deposits & puts you at risk for a heart attack  Call your doctor if you have any new pain, pressure, or discomfort in the center of your chest, pain, tingling or discomfort in arms, back, neck, jaw, or stomach, shortness of breath, nausea, vomiting, burping or heartburn, sweating, cold and clammy skin, racing or abnormal heartbeat for more than 10 minutes or if they keep coming & going.  Call 911 and do not tr to get to hospital by care  You can help yourself with lefestyle changes (quitting smoking if you smoke), eat lots of fruits & vegetables & low fat dairy products, not a lot of meat & fatty foods, walk or some form of physical activity most days of the week, lose weight if you are overweight  Take your cardiac medication as prescribed to lower cholesterol, to lower blood pressure, aspirin to prevent blood clots, and diabetes control  Make sure to keep appointments with doctor for cardiac follow up care Follow up with PCP for treatment goals, continue medication, have liver function testing every 3 months as anti lipid medications can cause liver irritation, eat low fat, low cholesterol meals continue with your synthroid. seen by neuro surgery. not new hematoma, no surgical intervention needed.  Please f/u with your neurologist in 2~4wks.

## 2017-07-25 NOTE — PROGRESS NOTE ADULT - PROBLEM SELECTOR PLAN 1
- Na trending down from admission: 135 to 128  - does not appear fluid overloaded or hypovolemic  - TSH wnl  - would urine osm 480  - Na improved w/ 800cc fluid restriction  - can start on salt tabs 1g TID if planning on d/c w/ outpt f/u w/ PMD for repeat bloodwork in in 3 days

## 2017-07-25 NOTE — DISCHARGE NOTE ADULT - CARE PLAN
Principal Discharge DX:	Syncope, unspecified syncope type  Goal:	stable  Instructions for follow-up, activity and diet:	status post echo; EF 55%, normal left ventricular function.  HOME CARE INSTRUCTIONS  Have someone stay with you until you feel stable.  Do not drive, operate machinery, or play sports until your caregiver says it is okay.  Keep all follow-up appointments as directed by your caregiver.   Lie down right away if you start feeling like you might faint. Breathe deeply and steadily. Wait until all the symptoms have passed.Drink enough fluids to keep your urine clear or pale yellow.  If you are taking blood pressure or heart medicine, get up slowly, taking several minutes to sit and then stand. This can reduce dizziness.  SEEK IMMEDIATE MEDICAL CARE IF:  You have a severe headache.  You have unusual pain in the chest, abdomen, or back.  You are bleeding from the mouth or rectum, or you have black or tarry stool.  You have an irregular or very fast heartbeat.  You have pain with breathing.  You have repeated fainting or seizure-like jerking during an episode.  You faint when sitting or lying down.  You have confusion.  You have difficulty walking.  You have severe weakness.  You have vision problems.  Secondary Diagnosis:	Hyponatremia  Instructions for follow-up, activity and diet:	stable. today's sodium 130 and normal urine lytes.  restriction of daily fluid 1 L.  f/u with your primary doctor in 1 wk to repeat BMP  Secondary Diagnosis:	Coronary artery disease involving coronary bypass graft without angina pectoris, unspecified whether native or transplanted heart  Instructions for follow-up, activity and diet:	Coronary artery disease is a condition where the arteries the supply the heart muscle get clogges with fatty deposits & puts you at risk for a heart attack  Call your doctor if you have any new pain, pressure, or discomfort in the center of your chest, pain, tingling or discomfort in arms, back, neck, jaw, or stomach, shortness of breath, nausea, vomiting, burping or heartburn, sweating, cold and clammy skin, racing or abnormal heartbeat for more than 10 minutes or if they keep coming & going.  Call 911 and do not tr to get to hospital by care  You can help yourself with lefestyle changes (quitting smoking if you smoke), eat lots of fruits & vegetables & low fat dairy products, not a lot of meat & fatty foods, walk or some form of physical activity most days of the week, lose weight if you are overweight  Take your cardiac medication as prescribed to lower cholesterol, to lower blood pressure, aspirin to prevent blood clots, and diabetes control  Make sure to keep appointments with doctor for cardiac follow up care  Secondary Diagnosis:	Hyperlipidemia, unspecified hyperlipidemia type  Instructions for follow-up, activity and diet:	Follow up with PCP for treatment goals, continue medication, have liver function testing every 3 months as anti lipid medications can cause liver irritation, eat low fat, low cholesterol meals  Secondary Diagnosis:	Hypothyroidism, unspecified type  Instructions for follow-up, activity and diet:	continue with your synthroid.  Secondary Diagnosis:	Subdural hematoma  Instructions for follow-up, activity and diet:	seen by neuro surgery. not new hematoma, no surgical intervention needed.  Please f/u with your neurologist in 2~4wks.

## 2017-07-25 NOTE — DISCHARGE NOTE ADULT - MEDICATION SUMMARY - MEDICATIONS TO STOP TAKING
I will STOP taking the medications listed below when I get home from the hospital:    fluticasone propionate  -- 1 spray(s) in each nostril once a day    clopidogrel 75 mg oral tablet  -- 1 tab(s) by mouth once a day    NexIUM 40 mg oral delayed release capsule  -- 1 cap(s) by mouth once a day    Lasix 40 mg oral tablet  -- 1 tab(s) by mouth once a day    potassium chloride 20 mEq oral tablet, extended release  -- 1 tab(s) by mouth once a day

## 2017-07-25 NOTE — DISCHARGE NOTE ADULT - MEDICATION SUMMARY - MEDICATIONS TO TAKE
I will START or STAY ON the medications listed below when I get home from the hospital:    aspirin 81 mg oral tablet  -- 1 tab(s) by mouth once a day  -- Indication: For CAD (coronary artery disease)    levETIRAcetam 500 mg oral tablet  -- 1 tab(s) by mouth 2 times a day from 7/22 to 7/28/17  -- Indication: For SDH (subdural hematoma)    rosuvastatin 5 mg oral tablet  -- 1 tab(s) by mouth once a day (at bedtime)  -- Indication: For CAD (coronary artery disease)    bicalutamide 50 mg oral tablet  -- 1 tab(s) by mouth every 24 hours  -- Indication: For Chemo therapy    metoprolol tartrate 25 mg oral tablet  -- 1 tab(s) by mouth 2 times a day  -- Indication: For CAD (coronary artery disease)    senna oral tablet  -- 2 tab(s) by mouth once a day (at bedtime)  -- Indication: For Constipation    latanoprost 0.005% ophthalmic solution  -- 1 drop(s) to each affected eye once a day (at bedtime)  -- Indication: For glaucoma    brimonidine-timolol 0.2%-0.5% ophthalmic solution  -- 1 drop(s) to each affected eye 2 times a day  -- Indication: For glaucoma    Azopt 1% ophthalmic suspension  -- 1 drop(s) to each affected eye 3 times a day  -- Indication: For glaucoma    levothyroxine 50 mcg (0.05 mg) oral tablet  -- 1 tab(s) by mouth once a day  -- Indication: For Hypothyroid    folic acid 1 mg oral tablet  -- 1 tab(s) by mouth once a day  -- Indication: For Supplement

## 2017-07-25 NOTE — DISCHARGE NOTE ADULT - SECONDARY DIAGNOSIS.
Hyponatremia Coronary artery disease involving coronary bypass graft without angina pectoris, unspecified whether native or transplanted heart Hyperlipidemia, unspecified hyperlipidemia type Hypothyroidism, unspecified type Subdural hematoma

## 2017-07-26 ENCOUNTER — INPATIENT (INPATIENT)
Facility: HOSPITAL | Age: 82
LOS: 1 days | Discharge: HOME | DRG: 312 | End: 2017-07-28
Attending: INTERNAL MEDICINE | Admitting: INTERNAL MEDICINE
Payer: MEDICARE

## 2017-07-26 VITALS
OXYGEN SATURATION: 99 % | HEART RATE: 67 BPM | RESPIRATION RATE: 18 BRPM | SYSTOLIC BLOOD PRESSURE: 150 MMHG | DIASTOLIC BLOOD PRESSURE: 75 MMHG | TEMPERATURE: 98 F

## 2017-07-26 DIAGNOSIS — Z95.1 PRESENCE OF AORTOCORONARY BYPASS GRAFT: Chronic | ICD-10-CM

## 2017-07-26 DIAGNOSIS — R55 SYNCOPE AND COLLAPSE: ICD-10-CM

## 2017-07-26 LAB
ANION GAP SERPL CALC-SCNC: 12 MMOL/L — SIGNIFICANT CHANGE UP (ref 5–17)
APPEARANCE UR: CLEAR — SIGNIFICANT CHANGE UP
BACTERIA # UR AUTO: NEGATIVE — SIGNIFICANT CHANGE UP
BILIRUB UR-MCNC: NEGATIVE — SIGNIFICANT CHANGE UP
BLD GP AB SCN SERPL QL: NEGATIVE — SIGNIFICANT CHANGE UP
BUN SERPL-MCNC: 19 MG/DL — SIGNIFICANT CHANGE UP (ref 7–23)
CALCIUM SERPL-MCNC: 9.4 MG/DL — SIGNIFICANT CHANGE UP (ref 8.4–10.5)
CHLORIDE SERPL-SCNC: 105 MMOL/L — SIGNIFICANT CHANGE UP (ref 96–108)
CK MB BLD-MCNC: 3.5 % — SIGNIFICANT CHANGE UP (ref 0–3.5)
CK MB BLD-MCNC: 6.1 % — HIGH (ref 0–3.5)
CK MB CFR SERPL CALC: 1.3 NG/ML — SIGNIFICANT CHANGE UP (ref 0–3.6)
CK MB CFR SERPL CALC: 2.3 NG/ML — SIGNIFICANT CHANGE UP (ref 0–6.7)
CK SERPL-CCNC: 37 U/L — SIGNIFICANT CHANGE UP (ref 26–308)
CK SERPL-CCNC: 38 U/L — SIGNIFICANT CHANGE UP (ref 30–200)
CO2 SERPL-SCNC: 20 MMOL/L — LOW (ref 22–31)
COLOR SPEC: YELLOW — SIGNIFICANT CHANGE UP
CREAT SERPL-MCNC: 0.93 MG/DL — SIGNIFICANT CHANGE UP (ref 0.5–1.3)
DIFF PNL FLD: ABNORMAL
EPI CELLS # UR: 0 /HPF — SIGNIFICANT CHANGE UP (ref 0–5)
GLUCOSE SERPL-MCNC: 97 MG/DL — SIGNIFICANT CHANGE UP (ref 70–99)
GLUCOSE UR QL: NEGATIVE MG/DL — SIGNIFICANT CHANGE UP
HCT VFR BLD CALC: 37.4 % — LOW (ref 39–50)
HGB BLD-MCNC: 12.4 G/DL — LOW (ref 13–17)
HYALINE CASTS # UR AUTO: 0 /LPF — SIGNIFICANT CHANGE UP (ref 0–7)
KETONES UR-MCNC: NEGATIVE — SIGNIFICANT CHANGE UP
LEUKOCYTE ESTERASE UR-ACNC: NEGATIVE — SIGNIFICANT CHANGE UP
MCHC RBC-ENTMCNC: 32.8 PG — SIGNIFICANT CHANGE UP (ref 27–34)
MCHC RBC-ENTMCNC: 33.1 GM/DL — SIGNIFICANT CHANGE UP (ref 32–36)
MCV RBC AUTO: 99.2 FL — SIGNIFICANT CHANGE UP (ref 80–100)
NITRITE UR-MCNC: NEGATIVE — SIGNIFICANT CHANGE UP
PH UR: 6.5 — SIGNIFICANT CHANGE UP (ref 5–8)
PLATELET # BLD AUTO: 191 K/UL — SIGNIFICANT CHANGE UP (ref 150–400)
POTASSIUM SERPL-MCNC: 3.8 MMOL/L — SIGNIFICANT CHANGE UP (ref 3.5–5.3)
POTASSIUM SERPL-SCNC: 3.8 MMOL/L — SIGNIFICANT CHANGE UP (ref 3.5–5.3)
PROT UR-MCNC: NEGATIVE MG/DL — SIGNIFICANT CHANGE UP
RBC # BLD: 3.77 M/UL — LOW (ref 4.2–5.8)
RBC # FLD: 13.4 % — SIGNIFICANT CHANGE UP (ref 10.3–14.5)
RBC CASTS # UR COMP ASSIST: 1 /HPF — SIGNIFICANT CHANGE UP (ref 0–4)
RH IG SCN BLD-IMP: POSITIVE — SIGNIFICANT CHANGE UP
RH IG SCN BLD-IMP: POSITIVE — SIGNIFICANT CHANGE UP
SODIUM SERPL-SCNC: 137 MMOL/L — SIGNIFICANT CHANGE UP (ref 135–145)
SP GR SPEC: 1.01 — LOW (ref 1.01–1.02)
TROPONIN T SERPL-MCNC: <0.01 NG/ML — SIGNIFICANT CHANGE UP (ref 0–0.06)
TROPONIN T SERPL-MCNC: <0.01 NG/ML — SIGNIFICANT CHANGE UP (ref 0–0.06)
UROBILINOGEN FLD QL: NEGATIVE MG/DL — SIGNIFICANT CHANGE UP
WBC # BLD: 7.6 K/UL — SIGNIFICANT CHANGE UP (ref 3.8–10.5)
WBC # FLD AUTO: 7.6 K/UL — SIGNIFICANT CHANGE UP (ref 3.8–10.5)
WBC UR QL: 0 /HPF — SIGNIFICANT CHANGE UP (ref 0–5)

## 2017-07-26 PROCEDURE — 96374 THER/PROPH/DIAG INJ IV PUSH: CPT

## 2017-07-26 PROCEDURE — 70450 CT HEAD/BRAIN W/O DYE: CPT

## 2017-07-26 PROCEDURE — 99223 1ST HOSP IP/OBS HIGH 75: CPT

## 2017-07-26 PROCEDURE — 70450 CT HEAD/BRAIN W/O DYE: CPT | Mod: 26

## 2017-07-26 PROCEDURE — 71010: CPT | Mod: 26

## 2017-07-26 PROCEDURE — 82550 ASSAY OF CK (CPK): CPT

## 2017-07-26 PROCEDURE — 99285 EMERGENCY DEPT VISIT HI MDM: CPT | Mod: 25

## 2017-07-26 PROCEDURE — 85730 THROMBOPLASTIN TIME PARTIAL: CPT

## 2017-07-26 PROCEDURE — 93005 ELECTROCARDIOGRAM TRACING: CPT

## 2017-07-26 PROCEDURE — 82553 CREATINE MB FRACTION: CPT

## 2017-07-26 PROCEDURE — 85610 PROTHROMBIN TIME: CPT

## 2017-07-26 PROCEDURE — 80053 COMPREHEN METABOLIC PANEL: CPT

## 2017-07-26 PROCEDURE — 85027 COMPLETE CBC AUTOMATED: CPT

## 2017-07-26 PROCEDURE — 36415 COLL VENOUS BLD VENIPUNCTURE: CPT

## 2017-07-26 PROCEDURE — 84484 ASSAY OF TROPONIN QUANT: CPT

## 2017-07-26 PROCEDURE — 71045 X-RAY EXAM CHEST 1 VIEW: CPT

## 2017-07-26 RX ORDER — SENNA PLUS 8.6 MG/1
2 TABLET ORAL AT BEDTIME
Qty: 0 | Refills: 0 | Status: DISCONTINUED | OUTPATIENT
Start: 2017-07-26 | End: 2017-07-28

## 2017-07-26 RX ORDER — ATORVASTATIN CALCIUM 80 MG/1
20 TABLET, FILM COATED ORAL AT BEDTIME
Qty: 0 | Refills: 0 | Status: DISCONTINUED | OUTPATIENT
Start: 2017-07-26 | End: 2017-07-28

## 2017-07-26 RX ORDER — METOPROLOL TARTRATE 50 MG
25 TABLET ORAL
Qty: 0 | Refills: 0 | Status: DISCONTINUED | OUTPATIENT
Start: 2017-07-26 | End: 2017-07-28

## 2017-07-26 RX ORDER — LEVETIRACETAM 250 MG/1
500 TABLET, FILM COATED ORAL
Qty: 0 | Refills: 0 | Status: DISCONTINUED | OUTPATIENT
Start: 2017-07-26 | End: 2017-07-26

## 2017-07-26 RX ORDER — LEVETIRACETAM 250 MG/1
750 TABLET, FILM COATED ORAL
Qty: 0 | Refills: 0 | Status: DISCONTINUED | OUTPATIENT
Start: 2017-07-26 | End: 2017-07-28

## 2017-07-26 RX ORDER — LATANOPROST 0.05 MG/ML
1 SOLUTION/ DROPS OPHTHALMIC; TOPICAL AT BEDTIME
Qty: 0 | Refills: 0 | Status: DISCONTINUED | OUTPATIENT
Start: 2017-07-26 | End: 2017-07-28

## 2017-07-26 RX ORDER — FOLIC ACID 0.8 MG
1 TABLET ORAL DAILY
Qty: 0 | Refills: 0 | Status: DISCONTINUED | OUTPATIENT
Start: 2017-07-26 | End: 2017-07-28

## 2017-07-26 RX ORDER — ASPIRIN/CALCIUM CARB/MAGNESIUM 324 MG
81 TABLET ORAL DAILY
Qty: 0 | Refills: 0 | Status: DISCONTINUED | OUTPATIENT
Start: 2017-07-26 | End: 2017-07-28

## 2017-07-26 RX ORDER — LEVETIRACETAM 250 MG/1
500 TABLET, FILM COATED ORAL ONCE
Qty: 0 | Refills: 0 | Status: COMPLETED | OUTPATIENT
Start: 2017-07-26 | End: 2017-07-26

## 2017-07-26 RX ORDER — HEPARIN SODIUM 5000 [USP'U]/ML
5000 INJECTION INTRAVENOUS; SUBCUTANEOUS EVERY 12 HOURS
Qty: 0 | Refills: 0 | Status: DISCONTINUED | OUTPATIENT
Start: 2017-07-26 | End: 2017-07-28

## 2017-07-26 RX ORDER — BICALUTAMIDE 50 MG/1
50 TABLET, FILM COATED ORAL DAILY
Qty: 0 | Refills: 0 | Status: DISCONTINUED | OUTPATIENT
Start: 2017-07-26 | End: 2017-07-28

## 2017-07-26 RX ORDER — DORZOLAMIDE HYDROCHLORIDE 20 MG/ML
1 SOLUTION/ DROPS OPHTHALMIC THREE TIMES A DAY
Qty: 0 | Refills: 0 | Status: DISCONTINUED | OUTPATIENT
Start: 2017-07-26 | End: 2017-07-28

## 2017-07-26 RX ORDER — LEVOTHYROXINE SODIUM 125 MCG
50 TABLET ORAL DAILY
Qty: 0 | Refills: 0 | Status: DISCONTINUED | OUTPATIENT
Start: 2017-07-26 | End: 2017-07-28

## 2017-07-26 RX ADMIN — SODIUM CHLORIDE 1000 MILLILITER(S): 9 INJECTION INTRAMUSCULAR; INTRAVENOUS; SUBCUTANEOUS at 00:19

## 2017-07-26 RX ADMIN — DORZOLAMIDE HYDROCHLORIDE 1 DROP(S): 20 SOLUTION/ DROPS OPHTHALMIC at 11:39

## 2017-07-26 RX ADMIN — LEVETIRACETAM 500 MILLIGRAM(S): 250 TABLET, FILM COATED ORAL at 11:39

## 2017-07-26 RX ADMIN — DORZOLAMIDE HYDROCHLORIDE 1 DROP(S): 20 SOLUTION/ DROPS OPHTHALMIC at 21:45

## 2017-07-26 RX ADMIN — ATORVASTATIN CALCIUM 20 MILLIGRAM(S): 80 TABLET, FILM COATED ORAL at 21:45

## 2017-07-26 RX ADMIN — Medication 81 MILLIGRAM(S): at 11:38

## 2017-07-26 RX ADMIN — LEVETIRACETAM 420 MILLIGRAM(S): 250 TABLET, FILM COATED ORAL at 02:00

## 2017-07-26 RX ADMIN — Medication 50 MICROGRAM(S): at 10:30

## 2017-07-26 RX ADMIN — Medication 25 MILLIGRAM(S): at 21:45

## 2017-07-26 RX ADMIN — LATANOPROST 1 DROP(S): 0.05 SOLUTION/ DROPS OPHTHALMIC; TOPICAL at 21:45

## 2017-07-26 RX ADMIN — SENNA PLUS 2 TABLET(S): 8.6 TABLET ORAL at 21:45

## 2017-07-26 RX ADMIN — Medication 1 MILLIGRAM(S): at 11:38

## 2017-07-26 RX ADMIN — Medication 25 MILLIGRAM(S): at 11:39

## 2017-07-26 RX ADMIN — HEPARIN SODIUM 5000 UNIT(S): 5000 INJECTION INTRAVENOUS; SUBCUTANEOUS at 17:49

## 2017-07-26 RX ADMIN — LEVETIRACETAM 750 MILLIGRAM(S): 250 TABLET, FILM COATED ORAL at 21:45

## 2017-07-26 RX ADMIN — BICALUTAMIDE 50 MILLIGRAM(S): 50 TABLET, FILM COATED ORAL at 21:45

## 2017-07-26 RX ADMIN — SODIUM CHLORIDE 3 MILLILITER(S): 9 INJECTION INTRAMUSCULAR; INTRAVENOUS; SUBCUTANEOUS at 00:19

## 2017-07-26 NOTE — CONSULT NOTE ADULT - ATTENDING COMMENTS
Discussed, reviewed imaging and agreed with above.
pt seen 7/27/17 10AM with team.  unclear etiology of recurrent AMS.  agree with recommendations above.

## 2017-07-26 NOTE — CONSULT NOTE ADULT - ASSESSMENT
No acute neurosurgical intervention. Resolving interhemispheric SDH seen on PV CTH today. No further imaging at this time. Continue 3 more days of Keppra 500 BID. Medicine eval for syncope.

## 2017-07-26 NOTE — CONSULT NOTE ADULT - SUBJECTIVE AND OBJECTIVE BOX
p ([id number])     HPI: 88M previously admitted to [facility] for mechanical fall with small interhemispheric SDH and recently discharged, now with syncopal fall at home presented to  where CTH showed resolving interhemipsheric SDH, no acute hemorrhages. He was transferred to [facility] for further care. He has history of cardiac disease and was previously on ASA plavix, however stopped due to recent SDH.     PAST MEDICAL HISTORY   CAD (coronary artery disease)  HLD (hyperlipidemia)  Hypothyroid    PAST SURGICAL HISTORY   S/P CABG (coronary artery bypass graft)      MEDICATIONS:  Antibiotics:    Neuro:    Anticoagulation: no current AC    Other:      SOCIAL HISTORY:   Occupation:   Marital Status:     FAMILY HISTORY:      REVIEW OF SYSTEMS:  Check here if all are normal other than Neurological [x]  General:  Eyes:  ENT:  Cardiac:  Respiratory:  GI:  Musculoskeletal:   Skin:  Neurologic:   Psychiatric:     PHYSICAL EXAMINATION:   T(C): 36.7 (07-26-17 @ 03:30), Max: 36.9 (07-25-17 @ 08:08)  HR: 67 (07-26-17 @ 03:30) (67 - 75)  BP: 150/75 (07-26-17 @ 03:30) (133/76 - 150/75)  RR: 18 (07-26-17 @ 03:30) (18 - 18)  SpO2: 99% (07-26-17 @ 03:30) (96% - 99%)  Wt(kg): --    General Examination:     Neurologic Examination:             Higher functions                 Normal [x]               Abnormal:      Cranial Nerves (ii-xii)           Normal [x]              Abnormal:     Motor Exam                       Normal [x]              Abnormal:                               Sensory Exam                   Normal [x]              Abnormal:    Reflexes                            Normal [x]              Abnormal:     Coordination                      Normal [x]              Abnormal:    Other:     LABS:                        11.9   8.02  )-----------( 225      ( 25 Jul 2017 07:41 )             34.6     07-25    130<L>  |  97  |  13  ----------------------------<  107<H>  3.7   |  16<L>  |  0.83    Ca    9.2      25 Jul 2017 07:49

## 2017-07-26 NOTE — ED ADULT NURSE NOTE - OBJECTIVE STATEMENT
89 y/o male presenting to the ED via EMS transfer from [place] for possible syncopal episode; Per patient's son patient was recently admitted for fall; Per son patient was sitting eating dinner when he began "frothing at the mouth and went limp"; Per EMS patient was given Keppra at previous facility; Patient denies n/v/d, dizziness, pain, headache, SOB, chest pain; a&ox3; safety and comfort measures provided; son at bedside 87 y/o male presenting to the ED via EMS transfer from [place] for possible syncopal episode; Per patient's son patient was recently admitted for fall; Per son patient was sitting eating dinner when he began "frothing at the mouth and went limp"; Per EMS patient was given Keppra at previous facility; Patient denies n/v/d, dizziness, pain, headache, SOB, chest pain; a&ox3 with periods of confusion; safety and comfort measures provided; son at bedside

## 2017-07-26 NOTE — ED ADULT NURSE REASSESSMENT NOTE - NS ED NURSE REASSESS COMMENT FT1
1130pm late note- pt son stated pt was just discharged earlier, was home sleeping, family woke patient up to eat when he passed out and started making gurgling noises. pt denies any memory of episode, denies SOB or pain

## 2017-07-26 NOTE — ED PROVIDER NOTE - OBJECTIVE STATEMENT
Julita Gonzalez, DO: 88M with hx CAD s/p 4v CABG, HLD, Hypothyoid transferred from [place] for collapse. Per son at bedside, symptoms similar to episode prior to collapse prior to CABG.  Pt was eating dinner tonight, was being fed Ensure by daughter and started to collapse, denies tremulousness. Per son at bedside, pt is confused.    PMD & Cards: based in [place]

## 2017-07-26 NOTE — ED PROVIDER NOTE - ATTENDING CONTRIBUTION TO CARE
88 yom pmhx cad s/p cabg, recently on AC but taken off after an admission w discharge yesterday after mechanical fall and interhemispheric SDH, presents after syncope today. as per family, they were trying to give pt some ensure, and during drinking he became minimally responsive "foaming at mouth," and was "limp" in his chair. did not fall again or hit head. no seizure activity. family states pt was confused after the event. states pt has been having recurrent syncope over last few years of unclear cause after cardiac w/u. pt was taken to [facility] ed today where repeat head ct was done which showed improving sdh, however pt was transferred to [facility] for neurosurg eval. pt was discharged on keppra.     ROS:   constitutional - no fever, no chills  eyes - no visual changes, no redness  eent - no sore throat, no nasal congestion  cvs - no chest pain, no leg swelling, + syncope  resp - no shortness of breath, no cough  gi - no abdominal pain, no vomiting, no diarrhea  gu - no dysuria, no hematuria  msk - no acute back pain, no joint swelling  skin - no rashes, no jaundice  neuro - no headache, no focal weakness  psych - no acute mental health issue     Physical Exam:   constitutional - well appearing, awake and alert, oriented x3  head - no external evidence of trauma  cvs - rrr, no murmurs, no peripheral edema  resp - breath sounds clear and equal bilat  gi - abdomen soft and nontender, no rigidity, guarding or rebound, bowel sounds present  msk - moving all extremities spontaneously  neuro - alert and oriented x3, no focal deficits, CNs 2-12 grossly intact  skin- no jaundice, warm and dry  psych - mood and affect wnl, no apparent risk to self or others     ? syncope vs seizure (less likely as family did not witness any seizure activity, pt is on keppra, however was somewhat confused after incident). neurosurg re-eval and medicine admit for ongoing syncope w/u. SHANE Pinon MD

## 2017-07-26 NOTE — ED PROVIDER NOTE - OBJECTIVE STATEMENT
pt with hx recent subdural hematoma, d/c from [place] this afternoon bib ems s/p episode of syncope tonight. per family, pt had clenched teeth, was foaming at mouth for few minutes, was drowsy for another few minutes, and is now back to baseline. no fevers, chill, ha, sob, palp, abd pain, focal weakness or numbness. pt reports had chest pressure earlier today  pmd - [place]

## 2017-07-26 NOTE — ED ADULT NURSE REASSESSMENT NOTE - NS ED NURSE REASSESS COMMENT FT1
Patient appears to be resting comfortably in stretcher; Patient seems confused at this time but is aware of time, place, self but not situation; denies pain at this time; safety and comfort measures provided

## 2017-07-26 NOTE — CONSULT NOTE ADULT - ASSESSMENT
88M with hx CAD s/p recent 4v CABG, HLD, Hypothyroid   s/p recurrent syncope  at home. patient had 2 episodes of LOC with post confusion for about 10-15 minutes. His episodes resembles for a possible seizure. Patient is currently on keppra. 88M with hx CAD s/p recent 4v CABG, HLD, Hypothyroid  s/p recurrent syncope  at home, known p TBI SDH (stable). Patient had 2 episodes of LOC with post confusion for about 10-15 minutes. His episodes resemble possible seizure disorder vs syncope due to substantial cardiac history. Patient is currently on keppra, agree with plan to inc to 750mg bid.    Rec VEEG x 24hrs for clarification of diagnosis, risk assessment.

## 2017-07-26 NOTE — CONSULT NOTE ADULT - ASSESSMENT
pt  WITH  SYNCOPE.  HAD  RECENT ECHO NORMAL, CT HEAD  WITH RESOLVING  SUBDURAL HEMATOMA    DEFER   CARDIAC    W/P  TO  DR WRIGHT    CHECK  ORTHOSTATICS  DVT PPX,  PT  EVAL

## 2017-07-26 NOTE — ED PROVIDER NOTE - PROGRESS NOTE DETAILS
d/w jaylyn ([facility] neurosurg) and then nicole ([facility]) and then jose ([facility] er) they will accept xfer er->er for neurosurg eval

## 2017-07-26 NOTE — H&P ADULT - ASSESSMENT
88M CAD ?dysphagia, syncope likely vasovagal, intermittent weakness, SDH  neuro eval  S+S eval  PT re-eval, ?needs [facility]  cont home meds  tele for now  orthostatics

## 2017-07-26 NOTE — CONSULT NOTE ADULT - PROBLEM SELECTOR RECOMMENDATION 9
Routine EEG  Keprpa dose was increased to 750 mg VEEG  Keppra dose was increased to 750 mg  cardiac telemetry/work up to exclude arrythmia  orthostatic vital signs

## 2017-07-26 NOTE — ED PROVIDER NOTE - TEMPLATE
Cardiac soft/no distention/normal/bowel sounds normal/nontender/no rebound tenderness/no rigidity/no guarding

## 2017-07-26 NOTE — CONSULT NOTE ADULT - SUBJECTIVE AND OBJECTIVE BOX
HPI:    88M with hx CAD s/p recent 4v CABG, HLD, Hypothyroid   s/p recurrent syncope  at home.     Daughter was bedside. History obtained from the patient and daughter.     He had 2 episodes of LOC at home. During first episode, he was brushing his teeth, standing and took thyroxine tablet. The next thing he remembered was in a different room. As per family, he had LOC and fall. He LOC for few minutes and remained confused for about 10-15 minutes before coming back to his baseline. During second episode, he was on bed and family tried to make him stand.     He denied nausea, palpitation, family denied pallor.        MEDICATIONS  (STANDING):  aspirin  chewable 81 milliGRAM(s) Oral daily  levETIRAcetam 500 milliGRAM(s) Oral two times a day  atorvastatin 20 milliGRAM(s) Oral at bedtime  bicalutamide 50 milliGRAM(s) Oral daily  metoprolol 25 milliGRAM(s) Oral two times a day  senna 2 Tablet(s) Oral at bedtime  dorzolamide 2% Ophthalmic Solution 1 Drop(s) Both EYES three times a day  latanoprost 0.005% Ophthalmic Solution 1 Drop(s) Both EYES at bedtime  levothyroxine 50 MICROGram(s) Oral daily  folic acid 1 milliGRAM(s) Oral daily  heparin  Injectable 5000 Unit(s) SubCutaneous every 12 hours    MEDICATIONS  (PRN):    PAST MEDICAL & SURGICAL HISTORY:  CAD (coronary artery disease)  HLD (hyperlipidemia)  Hypothyroid  S/P CABG (coronary artery bypass graft)    FAMILY HISTORY:    Allergies    No Known Allergies    Intolerances        SHx - No smoking, No ETOH, No drug abuse          Vital Signs Last 24 Hrs  T(C): 36.5 (26 Jul 2017 12:29), Max: 36.8 (26 Jul 2017 05:16)  T(F): 97.7 (26 Jul 2017 12:29), Max: 98.2 (26 Jul 2017 05:16)  HR: 87 (26 Jul 2017 12:29) (67 - 91)  BP: 117/61 (26 Jul 2017 05:50) (117/61 - 150/75)  BP(mean): --  RR: 18 (26 Jul 2017 12:29) (18 - 18)  SpO2: 97% (26 Jul 2017 12:29) (97% - 100%)    General Exam:     General appearance: No acute distress                   Neurological Exam:    Mental Status: Orientated to self, date and place.  Attention intact.  No dysarthria, aphasia or neglect.  Knowledge intact.      Cranial Nerves: PERRL, EOMI, VFF, no nystagmus or diplopia.  CN V1-3 intact to light touch and pinprick.  No facial asymmetry.  Hearing intact to finger rub bilaterally.  Tongue, uvula and palate midline.  Sternocleidomastoid and Trapezius intact bilaterally.    Motor:   Tone: normal.                  Strength:     [] Upper extremity                      Delt       Bicep    Tricep                                                  R         5/5 5/5 5/5 5/5                                               L          5/5 5/5 5/5 5/5  [] Lower extremity                       HF          KE          KF        DF         PF                                               R        5/5 5/5 5/5 5/5 5/5                                               L         5/5 5/5 5/5 5/5 5/5  Pronator drift: none                 Dysmetria: None to finger-nose-finger or heel-shin-heel  No truncal ataxia.    Tremor: No resting, postural or action tremor.  No myoclonus.    Sensation: intact to light touch, pinprick, vibration and proprioception    Deep Tendon Reflexes: 1+ bilateral biceps, triceps, brachioradialis, knee and ankle  Toes flexor bilaterally    Gait: normal and stable.      Other:    07-26    137  |  105  |  19  ----------------------------<  97  3.8   |  20<L>  |  0.93    Ca    9.4      26 Jul 2017 10:26      07-26    137  |  105  |  19  ----------------------------<  97  3.8   |  20<L>  |  0.93    Ca    9.4      26 Jul 2017 10:26                            12.4   7.6   )-----------( 191      ( 26 Jul 2017 10:26 )             37.4       Radiology    CT head     < from: CT Head No Cont (07.22.17 @ 18:03) >  IMPRESSION:  Small acute anterior interhemispheric subdural hematoma, unchanged from   prior study.    < end of copied text > HPI:    88M with hx CAD s/p recent 4v CABG, HLD, Hypothyroid   s/p recurrent syncope  at home.     Daughter was bedside. History obtained from the patient and daughter.     He had 2 episodes of LOC at home. During first episode, he was brushing his teeth, standing and took thyroxine tablet. The next thing he remembered was in a different room. As per family, he had LOC and fall. He had LOC/unresponsiveness for few minutes and remained confused for about 10-15 minutes before coming back to his baseline. During second episode, he was on bed and family tried to make him stand.     He denied nausea, palpitation, family denied pallor.      Reports about 6 episodes over the last 6 yrs, most frequently prior to his CABG.  prior events with hitting head, including recent SDH.  No prior head TBI, infxn, known stroke.     MEDICATIONS  (STANDING):  aspirin  chewable 81 milliGRAM(s) Oral daily  levETIRAcetam 500 milliGRAM(s) Oral two times a day  atorvastatin 20 milliGRAM(s) Oral at bedtime  bicalutamide 50 milliGRAM(s) Oral daily  metoprolol 25 milliGRAM(s) Oral two times a day  senna 2 Tablet(s) Oral at bedtime  dorzolamide 2% Ophthalmic Solution 1 Drop(s) Both EYES three times a day  latanoprost 0.005% Ophthalmic Solution 1 Drop(s) Both EYES at bedtime  levothyroxine 50 MICROGram(s) Oral daily  folic acid 1 milliGRAM(s) Oral daily  heparin  Injectable 5000 Unit(s) SubCutaneous every 12 hours    MEDICATIONS  (PRN):    PAST MEDICAL & SURGICAL HISTORY:  CAD (coronary artery disease)  HLD (hyperlipidemia)  Hypothyroid  S/P CABG (coronary artery bypass graft)    FAMILY HISTORY:    Allergies  No Known Allergies  Intolerances    SHx - No smoking, No ETOH, No drug abuse    Vital Signs Last 24 Hrs  T(C): 36.5 (26 Jul 2017 12:29), Max: 36.8 (26 Jul 2017 05:16)  T(F): 97.7 (26 Jul 2017 12:29), Max: 98.2 (26 Jul 2017 05:16)  HR: 87 (26 Jul 2017 12:29) (67 - 91)  BP: 117/61 (26 Jul 2017 05:50) (117/61 - 150/75)  RR: 18 (26 Jul 2017 12:29) (18 - 18)  SpO2: 97% (26 Jul 2017 12:29) (97% - 100%)    General Exam:     General appearance: No acute distress                   Neurological Exam:    Mental Status: Orientated to self, date and place.  Attention intact.  No dysarthria, aphasia or neglect.  Knowledge intact.      Cranial Nerves: PERRL, EOMI, VFF, no nystagmus or diplopia.  CN V1-3 intact to light touch and pinprick.  No facial asymmetry.  Hearing intact to finger rub bilaterally.  Tongue, uvula and palate midline.  Sternocleidomastoid and Trapezius intact bilaterally.    Motor:   Tone: normal.                  Strength:     [] Upper extremity                             Delt       Bicep    Tricep                                                  R         5/5        5/5        5/5       5/5                                               L          5/5        5/5        5/5       5/5  [] Lower extremity                       HF          KE          KF        DF         PF                                               R        5/5 5/5        5/5       5/5       5/5                                               L         5/5        5/5       5/5       5/5        5/5  Pronator drift: none                 Dysmetria: None to finger-nose-finger or heel-shin-heel  No truncal ataxia.    Tremor: No resting, postural or action tremor.  No myoclonus.    Sensation: intact to light touch, pinprick, vibration and proprioception    Deep Tendon Reflexes: 1+ bilateral biceps, triceps, brachioradialis, knee and ankle  Toes flexor bilaterally    Gait: normal and stable.      Other:    07-26    137  |  105  |  19  ----------------------------<  97  3.8   |  20<L>  |  0.93    Ca    9.4      26 Jul 2017 10:26                          12.4   7.6   )-----------( 191      ( 26 Jul 2017 10:26 )             37.4       Radiology    CT head     < from: CT Head No Cont (07.22.17 @ 18:03) >  IMPRESSION:  Small acute anterior interhemispheric subdural hematoma, unchanged from prior study.

## 2017-07-26 NOTE — H&P ADULT - HISTORY OF PRESENT ILLNESS
88M with hx CAD s/p recent 4v CABG, HLD, Hypothyoid   s/p recurrent syncope  at home,  Pt  recently  dc from hospital ct  head in er,   resolution of  subdural hematoma, , . Per son at bedside, symptoms similar to episode prior to collapse prior to CABG.  Pt was eating dinner tonight, was being fed Ensure by daughter , then had the  event, denies  cp/sob. abd pain,  denies tremulousness.  Son reports episodes of sudden weakness for some time.  Reports some gagging on food prior to event.

## 2017-07-26 NOTE — ED PROVIDER NOTE - CHIEF COMPLAINT
The patient is a 88y Male complaining of see chief complaint quote. The patient is a 88y Male complaining of syncope

## 2017-07-26 NOTE — CONSULT NOTE ADULT - SUBJECTIVE AND OBJECTIVE BOX
88M with hx CAD s/p 4v CABG, HLD, Hypothyoid   s/p  syncope  at home,  pt  recently  dc from hospital ct  head in er,   resolution of  subdural hematoma, , . Per son at bedside, symptoms similar to episode prior to collapse prior to CABG.  Pt was eating dinner tonight, was being fed Ensure by daughter , then had the  event, denies  cp/sob. abd pain,  denies tremulousness.f	    Past Medical History:  CAD (coronary artery disease)    REVIEW OF SYSTEMS:    CONSTITUTIONAL: No weakness, fevers or chills  EYES/ENT: No visual changes;  No vertigo or throat pain   NECK: No pain or stiffness  RESPIRATORY: No cough, wheezing, hemoptysis; No shortness of breath  CARDIOVASCULAR: No chest pain or palpitations  GASTROINTESTINAL: No abdominal or epigastric pain. No nausea, vomiting, or hematemesis; No diarrhea or constipation. No melena or hematochezia.  GENITOURINARY: No dysuria, frequency or hematuria  NEUROLOGICAL: No numbness or weakness  SKIN: No itching, rashes    PHYSICAL EXAMINATION:  Vital Signs Last 24 Hrs  T(C): 36.7 (26 Jul 2017 05:50), Max: 36.9 (25 Jul 2017 12:03)  T(F): 98.1 (26 Jul 2017 05:50), Max: 98.4 (25 Jul 2017 12:03)  HR: 91 (26 Jul 2017 05:50) (67 - 91)  BP: 117/61 (26 Jul 2017 05:50) (117/61 - 150/75)  BP(mean): --  RR: 18 (26 Jul 2017 05:50) (18 - 18)  SpO2: 98% (26 Jul 2017 05:50) (96% - 100%)  CAPILLARY BLOOD GLUCOSE            GENERAL: NAD, well-groomed, well-developed  HEAD:  atraumatic, normocephalic  EYES: sclera anicteric  ENMT: mucous membranes moist  NECK: supple, No JVD  CHEST/LUNG: clear to auscultation bilaterally; no rales, rhonchi, or wheezing b/l  HEART: normal S1, S2  ABDOMEN: BS+, soft, ND, NT   EXTREMITIES:  pulses palpable; no clubbing, cyanosis, or edema b/l LEs  NEURO: awake, alert, interactive; moves all extremities  SKIN: no rashes or lesions      LABS:                        11.9   8.02  )-----------( 225      ( 25 Jul 2017 07:41 )             34.6     07-25    130<L>  |  97  |  13  ----------------------------<  107<H>  3.7   |  16<L>  |  0.83    Ca    9.2      25 Jul 2017 07:49              RADIOLOGY & ADDITIONAL TESTS:

## 2017-07-27 LAB
ANION GAP SERPL CALC-SCNC: 14 MMOL/L — SIGNIFICANT CHANGE UP (ref 5–17)
BUN SERPL-MCNC: 19 MG/DL — SIGNIFICANT CHANGE UP (ref 7–23)
CALCIUM SERPL-MCNC: 9.3 MG/DL — SIGNIFICANT CHANGE UP (ref 8.4–10.5)
CHLORIDE SERPL-SCNC: 103 MMOL/L — SIGNIFICANT CHANGE UP (ref 96–108)
CO2 SERPL-SCNC: 18 MMOL/L — LOW (ref 22–31)
CREAT SERPL-MCNC: 0.86 MG/DL — SIGNIFICANT CHANGE UP (ref 0.5–1.3)
CULTURE RESULTS: SIGNIFICANT CHANGE UP
GLUCOSE SERPL-MCNC: 89 MG/DL — SIGNIFICANT CHANGE UP (ref 70–99)
HCT VFR BLD CALC: 35.4 % — LOW (ref 39–50)
HGB BLD-MCNC: 12 G/DL — LOW (ref 13–17)
MCHC RBC-ENTMCNC: 33.5 PG — SIGNIFICANT CHANGE UP (ref 27–34)
MCHC RBC-ENTMCNC: 34 GM/DL — SIGNIFICANT CHANGE UP (ref 32–36)
MCV RBC AUTO: 98.7 FL — SIGNIFICANT CHANGE UP (ref 80–100)
PLATELET # BLD AUTO: 208 K/UL — SIGNIFICANT CHANGE UP (ref 150–400)
POTASSIUM SERPL-MCNC: 3.9 MMOL/L — SIGNIFICANT CHANGE UP (ref 3.5–5.3)
POTASSIUM SERPL-SCNC: 3.9 MMOL/L — SIGNIFICANT CHANGE UP (ref 3.5–5.3)
RBC # BLD: 3.59 M/UL — LOW (ref 4.2–5.8)
RBC # FLD: 13.3 % — SIGNIFICANT CHANGE UP (ref 10.3–14.5)
SODIUM SERPL-SCNC: 135 MMOL/L — SIGNIFICANT CHANGE UP (ref 135–145)
SPECIMEN SOURCE: SIGNIFICANT CHANGE UP
WBC # BLD: 8.9 K/UL — SIGNIFICANT CHANGE UP (ref 3.8–10.5)
WBC # FLD AUTO: 8.9 K/UL — SIGNIFICANT CHANGE UP (ref 3.8–10.5)

## 2017-07-27 PROCEDURE — 99223 1ST HOSP IP/OBS HIGH 75: CPT

## 2017-07-27 RX ADMIN — HEPARIN SODIUM 5000 UNIT(S): 5000 INJECTION INTRAVENOUS; SUBCUTANEOUS at 16:43

## 2017-07-27 RX ADMIN — LEVETIRACETAM 750 MILLIGRAM(S): 250 TABLET, FILM COATED ORAL at 16:43

## 2017-07-27 RX ADMIN — Medication 50 MICROGRAM(S): at 06:07

## 2017-07-27 RX ADMIN — DORZOLAMIDE HYDROCHLORIDE 1 DROP(S): 20 SOLUTION/ DROPS OPHTHALMIC at 12:44

## 2017-07-27 RX ADMIN — LATANOPROST 1 DROP(S): 0.05 SOLUTION/ DROPS OPHTHALMIC; TOPICAL at 22:08

## 2017-07-27 RX ADMIN — ATORVASTATIN CALCIUM 20 MILLIGRAM(S): 80 TABLET, FILM COATED ORAL at 22:09

## 2017-07-27 RX ADMIN — Medication 25 MILLIGRAM(S): at 22:09

## 2017-07-27 RX ADMIN — BICALUTAMIDE 50 MILLIGRAM(S): 50 TABLET, FILM COATED ORAL at 22:09

## 2017-07-27 RX ADMIN — HEPARIN SODIUM 5000 UNIT(S): 5000 INJECTION INTRAVENOUS; SUBCUTANEOUS at 06:07

## 2017-07-27 RX ADMIN — DORZOLAMIDE HYDROCHLORIDE 1 DROP(S): 20 SOLUTION/ DROPS OPHTHALMIC at 06:08

## 2017-07-27 RX ADMIN — Medication 25 MILLIGRAM(S): at 11:37

## 2017-07-27 RX ADMIN — Medication 81 MILLIGRAM(S): at 11:37

## 2017-07-27 RX ADMIN — DORZOLAMIDE HYDROCHLORIDE 1 DROP(S): 20 SOLUTION/ DROPS OPHTHALMIC at 22:09

## 2017-07-27 RX ADMIN — Medication 1 MILLIGRAM(S): at 11:37

## 2017-07-27 RX ADMIN — SENNA PLUS 2 TABLET(S): 8.6 TABLET ORAL at 22:09

## 2017-07-27 RX ADMIN — LEVETIRACETAM 750 MILLIGRAM(S): 250 TABLET, FILM COATED ORAL at 06:07

## 2017-07-27 NOTE — PROGRESS NOTE ADULT - SUBJECTIVE AND OBJECTIVE BOX
- Patient seen and examined.  - In summary, patient is a 88y year old man who presented with syncope (2017 09:17)  - Today, patient is without complaints.         *****MEDICATIONS:    MEDICATIONS  (STANDING):  aspirin  chewable 81 milliGRAM(s) Oral daily  atorvastatin 20 milliGRAM(s) Oral at bedtime  bicalutamide 50 milliGRAM(s) Oral daily  metoprolol 25 milliGRAM(s) Oral two times a day  senna 2 Tablet(s) Oral at bedtime  dorzolamide 2% Ophthalmic Solution 1 Drop(s) Both EYES three times a day  latanoprost 0.005% Ophthalmic Solution 1 Drop(s) Both EYES at bedtime  levothyroxine 50 MICROGram(s) Oral daily  folic acid 1 milliGRAM(s) Oral daily  heparin  Injectable 5000 Unit(s) SubCutaneous every 12 hours  levETIRAcetam 750 milliGRAM(s) Oral two times a day    MEDICATIONS  (PRN):           ***** REVIEW OF SYSTEM:  GEN: no fever, no chills, no pain  RESP: no SOB, no cough, no sputum  CVS: no chest pain, no palpitations, no edema  GI: no abdominal pain, no nausea, no vomiting, no constipation, no diarrhea  : no dysurea, no frequency  NEURO: no headache, no diziness  PSYCH: no depression, not anxious  Derm : no itching, no rash         ***** VITAL SIGNS:  T(F): 98.5 (17 @ 04:40), Max: 98.5 (17 @ 04:40)  HR: 87 (17 @ 05:02) (80 - 91)  BP: 127/69 (17 @ 05:02) (127/69 - 149/72)  RR: 18 (17 @ 04:40) (18 - 18)  SpO2: 99% (17 @ 04:40) (97% - 99%)  Wt(kg): --  ,   I&O's Summary    2017 07:01  -  2017 07:00  --------------------------------------------------------  IN: 480 mL / OUT: 1000 mL / NET: -520 mL             *****PHYSICAL EXAM:  GEN: A&O X 3 , NAD , comfortable  HEENT: NCAT, EOMI, MMM, no icterus  NECK: Supple, No JVD  CVS: S1S2 , regular , No M/R/G appreciated  PULM: CTA B/L,  no W/R/R appreciated  ABD.: soft. non tender, non distended,  bowel sounds present  Extrem: intact pulses , no edema noted  Derm: No rash or ecchymosis noted  PSYCH: normal mood, no depression, not anxious         *****LAB AND IMAGIN.0   8.9   )-----------( 208      ( 2017 06:40 )             35.4               07-    135  |  103  |  19  ----------------------------<  89  3.9   |  18<L>  |  0.86    Ca    9.3      2017 06:37             CARDIAC MARKERS ( 2017 10:26 )  x     / <0.01 ng/mL / 38 U/L / x     / 2.3 ng/mL  CARDIAC MARKERS ( 2017 04:32 )  x     / <0.01 ng/mL / x     / x     / x                  Urinalysis Basic - ( 2017 07:04 )    Color: Yellow / Appearance: Clear / S.008 / pH: x  Gluc: x / Ketone: Negative  / Bili: Negative / Urobili: Negative mg/dL   Blood: x / Protein: Negative mg/dL / Nitrite: Negative   Leuk Esterase: Negative / RBC: 1 /HPF / WBC 0 /HPF   Sq Epi: x / Non Sq Epi: 0 /HPF / Bacteria: Negative      [All pertinent recent Imaging/Reports reviewed]         *****A S S E S S M E N T   A N D   P L A N :  88M CAD ?dysphagia, syncope vasovagal vs seizure, intermittent weakness, SDH  neuro eval noted  S+S eval  PT re-eval, ?needs [facility]NER  cont home meds  tele for now  orthostatics        __________________________  DM Castillo D.O. - Patient seen and examined.  - In summary, patient is a 88y year old man who presented with syncope (2017 09:17)  - Today, patient is without complaints.         *****MEDICATIONS:    MEDICATIONS  (STANDING):  aspirin  chewable 81 milliGRAM(s) Oral daily  atorvastatin 20 milliGRAM(s) Oral at bedtime  bicalutamide 50 milliGRAM(s) Oral daily  metoprolol 25 milliGRAM(s) Oral two times a day  senna 2 Tablet(s) Oral at bedtime  dorzolamide 2% Ophthalmic Solution 1 Drop(s) Both EYES three times a day  latanoprost 0.005% Ophthalmic Solution 1 Drop(s) Both EYES at bedtime  levothyroxine 50 MICROGram(s) Oral daily  folic acid 1 milliGRAM(s) Oral daily  heparin  Injectable 5000 Unit(s) SubCutaneous every 12 hours  levETIRAcetam 750 milliGRAM(s) Oral two times a day    MEDICATIONS  (PRN):           ***** REVIEW OF SYSTEM:  GEN: no fever, no chills, no pain  RESP: no SOB, no cough, no sputum  CVS: no chest pain, no palpitations, no edema  GI: no abdominal pain, no nausea, no vomiting, no constipation, no diarrhea  : no dysurea, no frequency  NEURO: no headache, no diziness  PSYCH: no depression, not anxious  Derm : no itching, no rash         ***** VITAL SIGNS:  T(F): 98.5 (17 @ 04:40), Max: 98.5 (17 @ 04:40)  HR: 87 (17 @ 05:02) (80 - 91)  BP: 127/69 (17 @ 05:02) (127/69 - 149/72)  RR: 18 (17 @ 04:40) (18 - 18)  SpO2: 99% (17 @ 04:40) (97% - 99%)  Wt(kg): --  ,   I&O's Summary    2017 07:01  -  2017 07:00  --------------------------------------------------------  IN: 480 mL / OUT: 1000 mL / NET: -520 mL             *****PHYSICAL EXAM:  GEN: A&O X 3 , NAD , comfortable  HEENT: NCAT, EOMI, MMM, no icterus  NECK: Supple, No JVD  CVS: S1S2 , regular , No M/R/G appreciated  PULM: CTA B/L,  no W/R/R appreciated  ABD.: soft. non tender, non distended,  bowel sounds present  Extrem: intact pulses , no edema noted  Derm: No rash or ecchymosis noted  PSYCH: normal mood, no depression, not anxious         *****LAB AND IMAGIN.0   8.9   )-----------( 208      ( 2017 06:40 )             35.4               07-    135  |  103  |  19  ----------------------------<  89  3.9   |  18<L>  |  0.86    Ca    9.3      2017 06:37             CARDIAC MARKERS ( 2017 10:26 )  x     / <0.01 ng/mL / 38 U/L / x     / 2.3 ng/mL  CARDIAC MARKERS ( 2017 04:32 )  x     / <0.01 ng/mL / x     / x     / x                  Urinalysis Basic - ( 2017 07:04 )    Color: Yellow / Appearance: Clear / S.008 / pH: x  Gluc: x / Ketone: Negative  / Bili: Negative / Urobili: Negative mg/dL   Blood: x / Protein: Negative mg/dL / Nitrite: Negative   Leuk Esterase: Negative / RBC: 1 /HPF / WBC 0 /HPF   Sq Epi: x / Non Sq Epi: 0 /HPF / Bacteria: Negative      [All pertinent recent Imaging/Reports reviewed]         *****A S S E S S M E N T   A N D   P L A N :  88M CAD ?dysphagia, syncope vasovagal vs seizure, intermittent weakness, SDH  neuro eval noted  await EEG  cont keppra  S+S eval  PT re-eval, ?needs [facility]  cont home meds  tele for now  orthostatics        __________________________  A. Anna, D.O.

## 2017-07-27 NOTE — PROGRESS NOTE ADULT - SUBJECTIVE AND OBJECTIVE BOX
SUBJECTIVE / OVERNIGHT EVENTS: No nausea, vomiting or diarrhea, no fever or chills, no dizziness or chest pain, no dysuria or hematuria .      CAPILLARY BLOOD GLUCOSE        I&O's Summary    2017 07:01  -  2017 07:00  --------------------------------------------------------  IN: 480 mL / OUT: 1000 mL / NET: -520 mL        PHYSICAL EXAM:  HEAD:  Atraumatic, Normocephalic  EYES: EOMI, PERRLA, conjunctiva and sclera clear  NECK: Supple, No JVD  CHEST/LUNG: Clear to auscultation bilaterally; No wheeze  HEART: Regular rate and rhythm; No murmurs, rubs, or gallops  ABDOMEN: Soft, Nontender, Nondistended; Bowel sounds present  EXTREMITIES:  2+ Peripheral Pulses, No clubbing, cyanosis, or edema  PSYCH: AAOx3  NEUROLOGY: non-focal  SKIN: No rashes or lesions    MEDICATIONS  (STANDING):  aspirin  chewable 81 milliGRAM(s) Oral daily  atorvastatin 20 milliGRAM(s) Oral at bedtime  bicalutamide 50 milliGRAM(s) Oral daily  metoprolol 25 milliGRAM(s) Oral two times a day  senna 2 Tablet(s) Oral at bedtime  dorzolamide 2% Ophthalmic Solution 1 Drop(s) Both EYES three times a day  latanoprost 0.005% Ophthalmic Solution 1 Drop(s) Both EYES at bedtime  levothyroxine 50 MICROGram(s) Oral daily  folic acid 1 milliGRAM(s) Oral daily  heparin  Injectable 5000 Unit(s) SubCutaneous every 12 hours  levETIRAcetam 750 milliGRAM(s) Oral two times a day    MEDICATIONS  (PRN):      LABS:                        12.0   8.9   )-----------( 208      ( 2017 06:40 )             35.4     07-    135  |  103  |  19  ----------------------------<  89  3.9   |  18<L>  |  0.86    Ca    9.3      2017 06:37        CARDIAC MARKERS ( 2017 10:26 )  x     / <0.01 ng/mL / 38 U/L / x     / 2.3 ng/mL  CARDIAC MARKERS ( 2017 04:32 )  x     / <0.01 ng/mL / x     / x     / x          Urinalysis Basic - ( 2017 07:04 )    Color: Yellow / Appearance: Clear / S.008 / pH: x  Gluc: x / Ketone: Negative  / Bili: Negative / Urobili: Negative mg/dL   Blood: x / Protein: Negative mg/dL / Nitrite: Negative   Leuk Esterase: Negative / RBC: 1 /HPF / WBC 0 /HPF   Sq Epi: x / Non Sq Epi: 0 /HPF / Bacteria: Negative              Thyroid Stimulating Hormone, Serum: 0.32 uIU/mL ( @ 08:42)      Cultures:    EKG:    Radiological Studies:    Consultant(s) Notes Reviewed:      Care Discussed with Consultants/Other Providers:

## 2017-07-28 VITALS
TEMPERATURE: 98 F | OXYGEN SATURATION: 97 % | SYSTOLIC BLOOD PRESSURE: 132 MMHG | DIASTOLIC BLOOD PRESSURE: 74 MMHG | RESPIRATION RATE: 18 BRPM | HEART RATE: 86 BPM

## 2017-07-28 PROCEDURE — 80048 BASIC METABOLIC PNL TOTAL CA: CPT

## 2017-07-28 PROCEDURE — 85027 COMPLETE CBC AUTOMATED: CPT

## 2017-07-28 PROCEDURE — 86900 BLOOD TYPING SEROLOGIC ABO: CPT

## 2017-07-28 PROCEDURE — 95819 EEG AWAKE AND ASLEEP: CPT

## 2017-07-28 PROCEDURE — 95957 EEG DIGITAL ANALYSIS: CPT | Mod: 26

## 2017-07-28 PROCEDURE — 95819 EEG AWAKE AND ASLEEP: CPT | Mod: 26

## 2017-07-28 PROCEDURE — 97161 PT EVAL LOW COMPLEX 20 MIN: CPT

## 2017-07-28 PROCEDURE — 81001 URINALYSIS AUTO W/SCOPE: CPT

## 2017-07-28 PROCEDURE — 92610 EVALUATE SWALLOWING FUNCTION: CPT

## 2017-07-28 PROCEDURE — 84484 ASSAY OF TROPONIN QUANT: CPT

## 2017-07-28 PROCEDURE — 82553 CREATINE MB FRACTION: CPT

## 2017-07-28 PROCEDURE — 95957 EEG DIGITAL ANALYSIS: CPT

## 2017-07-28 PROCEDURE — 87086 URINE CULTURE/COLONY COUNT: CPT

## 2017-07-28 PROCEDURE — 82550 ASSAY OF CK (CPK): CPT

## 2017-07-28 PROCEDURE — 86850 RBC ANTIBODY SCREEN: CPT

## 2017-07-28 PROCEDURE — 99285 EMERGENCY DEPT VISIT HI MDM: CPT

## 2017-07-28 PROCEDURE — 86901 BLOOD TYPING SEROLOGIC RH(D): CPT

## 2017-07-28 RX ORDER — LEVETIRACETAM 250 MG/1
1 TABLET, FILM COATED ORAL
Qty: 60 | Refills: 0 | OUTPATIENT
Start: 2017-07-28 | End: 2017-08-27

## 2017-07-28 RX ADMIN — Medication 1 MILLIGRAM(S): at 09:13

## 2017-07-28 RX ADMIN — DORZOLAMIDE HYDROCHLORIDE 1 DROP(S): 20 SOLUTION/ DROPS OPHTHALMIC at 14:00

## 2017-07-28 RX ADMIN — Medication 50 MICROGRAM(S): at 05:56

## 2017-07-28 RX ADMIN — LEVETIRACETAM 750 MILLIGRAM(S): 250 TABLET, FILM COATED ORAL at 17:03

## 2017-07-28 RX ADMIN — DORZOLAMIDE HYDROCHLORIDE 1 DROP(S): 20 SOLUTION/ DROPS OPHTHALMIC at 05:55

## 2017-07-28 RX ADMIN — HEPARIN SODIUM 5000 UNIT(S): 5000 INJECTION INTRAVENOUS; SUBCUTANEOUS at 05:55

## 2017-07-28 RX ADMIN — Medication 81 MILLIGRAM(S): at 09:13

## 2017-07-28 RX ADMIN — LEVETIRACETAM 750 MILLIGRAM(S): 250 TABLET, FILM COATED ORAL at 05:56

## 2017-07-28 RX ADMIN — Medication 25 MILLIGRAM(S): at 09:13

## 2017-07-28 NOTE — SWALLOW BEDSIDE ASSESSMENT ADULT - SWALLOW EVAL: DIAGNOSIS
Patient presents with padmaja-pharyngeal dysphagia with s/s suggestive of possible pharyngeal residue and laryngeal penetration/aspiration of thin liquids. Baseline cough makes behavioral analysis of swallow function difficult to a degree.

## 2017-07-28 NOTE — DISCHARGE NOTE ADULT - OTHER SIGNIFICANT FINDINGS
EF (Teicholtz): 55 %  Doppler Peak Velocity (m/sec): AoV=2.4  ------------------------------------------------------------------------  Observations:  Mitral Valve: Mitral annular calcification and calcified  mitral leaflets with normal diastolic opening. Moderate  mitral regurgitation.  Aortic Valve/Aorta: Calcified trileaflet aortic valve with  decreased opening. Peak transaortic valve gradient equals  23 mm Hg, mean transaortic valve gradient equals 7 mm Hg,  aortic valve velocity time integral equals 50 cm. Mild  aortic regurgitation.  Peak left ventricular outflow tract  gradient equals 3 mm Hg, mean gradient is equal to 2 mm Hg,  LVOT velocity time integral equals 19 cm.  Aortic Root: 2.9 cm.  LVOT diameter: 1.8 cm.  Left Atrium: Normal left atrium.  LA volume index = 22  cc/m2.  Left Ventricle: Normal left ventricular systolic function.  No segmental wall motion abnormalities. Increased relative  wall thickness with normal left ventricular mass index,  consistent with concentric left ventricular remodeling.  Right Heart: Normal right atrium. The right ventricle is  not well visualized. Normal tricuspid valve. Minimal  tricuspid regurgitation. Normal pulmonic valve. Minimal  pulmonic regurgitation.  Pericardium/Pleura: Small pericardial effusion.  Hemodynamic: Estimated right atrial pressure is 8 mm Hg.  Estimated right ventricular systolic pressure equals 33 mm  Hg, assuming right atrial pressure equals 8 mm Hg,  consistent with normal pulmonary pressures.  ------------------------------------------------------------------------  7/24/17: TTE:  Conclusions:  1. Calcified trileaflet aortic valve with decreased  opening. Peak transaortic valve gradient equals 23 mm Hg,  mean transaortic valve gradient equals 7 mm Hg, aortic  valve velocity time integral equals 50 cm.  2. Increased relative wall thickness with normal left  ventricular mass index, consistent with concentric left  ventricular remodeling.  3. Normal left ventricular systolic function. No segmental  wall motion abnormalities.    7/22/17: CT Head:  FINDINGS:  There is redemonstration of the small acute subdural hematoma with   thickness of 3 mm layering along the anterior hemispheric falx with   unchanged.

## 2017-07-28 NOTE — SWALLOW BEDSIDE ASSESSMENT ADULT - SWALLOW EVAL: RECOMMENDED DIET
Dysphagia III with nectar thick liquids. Dysphagia III with nectar thick liquids. This dietary recommendation and plan for modified barium swallow was discussed with pt and pt's daughter who agreed to exam and POC. Daughter then asked for this service to contact Pt's son Bhupinder via phone to discuss. Despite counseling and several discussions re: rationale for diet change and swallow study, son declined entire suggested POC. This service will therefore sign off on case.

## 2017-07-28 NOTE — PROGRESS NOTE ADULT - ASSESSMENT
s/p  syncope,  no etiology  found   card to  f/p, not orthostatic  awiating  eeg, on  dvt ppx, htn,  h/o ca prostate,  h/o  subdural  hematoma

## 2017-07-28 NOTE — SWALLOW BEDSIDE ASSESSMENT ADULT - NS ASR SWALLOW FINDINGS DISCUS
d/w pt and daughter at length in room, daughter than requested that this service contact son Bhupinder at [phone] to discuss, reviewed with medicine NP Eveline Recinos d/w pt and daughter at length in room, daughter than requested that this service contact son Bhupinder at [phone] to discuss- x2, reviewed with medicine NP Eveline Recinos d/w pt and daughter at length in room, daughter than requested that this service contact son Bhupinder at [phone] to discuss- x2, reviewed with medicine NP Eveline Recinos [id number]

## 2017-07-28 NOTE — DISCHARGE NOTE ADULT - MEDICATION SUMMARY - MEDICATIONS TO TAKE
I will START or STAY ON the medications listed below when I get home from the hospital:    physical therapy  -- PT  -- Indication: For Strength and gait training    aspirin 81 mg oral tablet  -- 1 tab(s) by mouth once a day  -- Indication: For CAD    rosuvastatin 5 mg oral tablet  -- 1 tab(s) by mouth once a day (at bedtime)  -- Indication: For hyperlipidemia    bicalutamide 50 mg oral tablet  -- 1 tab(s) by mouth every 24 hours  -- Indication: For prostate CA    metoprolol tartrate 25 mg oral tablet  -- 1 tab(s) by mouth 2 times a day  -- Indication: For Hx CABG    senna oral tablet  -- 2 tab(s) by mouth once a day (at bedtime)  -- Indication: For constipation    latanoprost 0.005% ophthalmic solution  -- 1 drop(s) to each affected eye once a day (at bedtime)  -- Indication: For glaucoma    brimonidine-timolol 0.2%-0.5% ophthalmic solution  -- 1 drop(s) to each affected eye 2 times a day  -- Indication: For glaucoma    Azopt 1% ophthalmic suspension  -- 1 drop(s) to each affected eye 3 times a day  -- Indication: For glaucoma    levothyroxine 50 mcg (0.05 mg) oral tablet  -- 1 tab(s) by mouth once a day  -- Indication: For hypothyroid    folic acid 1 mg oral tablet  -- 1 tab(s) by mouth once a day  -- Indication: For Suppliment

## 2017-07-28 NOTE — DISCHARGE NOTE ADULT - CARE PLAN
Principal Discharge DX:	Subdural hematoma  Goal:	resolution of event  Instructions for follow-up, activity and diet:	You have had a small area of bleeding around your brain.  You need to protect yourself from trauma to your head.  If you hit your head you need to follow up with your physician or local hospital urgently.  If you experience headache, visual changes, confusion or change in awareness you need to be evaluated at the hospital urgently  You should not fly until you have completed the follow up Neurologist exam this week and been cleared by the Neurologist for airplane travel.  Secondary Diagnosis:	Syncope  Instructions for follow-up, activity and diet:	Continue medications as prescribed  Follow up with your Primary care Physician and Cardiologist within 1-2 weeks. Call office for appointment.  If you are unable to travel home within the next 2 weeks you will need to follow up with a physician here in [place]. You can follow up at the [facility] medical clinic at [phone]  Secondary Diagnosis:	Dysphagia  Instructions for follow-up, activity and diet:	Follow prescribed diet with soft foods and nectar thickened fluids to prevent aspiration of food and fluids into the lungs. Coughing with intake may indicate aspiration.  Sit up during eating and drinking. Use a spoon and eat and drink slowly. Chew thoroughly.  If coughing persists or you develop shortness of breath, difficulty breathing or fever call your physician or seek medical care promptly Principal Discharge DX:	Subdural hematoma  Goal:	resolution of event  Instructions for follow-up, activity and diet:	You have had a small area of bleeding around your brain.  You need to protect yourself from trauma to your head.  If you hit your head you need to follow up with your physician or local hospital urgently.  If you experience headache, visual changes, confusion or change in awareness you need to be evaluated at the hospital urgently  You should not fly until you have completed the follow up Neurologist exam this week and been cleared by the Neurologist for airplane travel.  Secondary Diagnosis:	Syncope  Instructions for follow-up, activity and diet:	Continue medications as prescribed  Wearing compression stockings can help reduce the risk of decreased blood pressure and should be worn daily when out of bed.  Follow up with your Primary care Physician and Cardiologist within 1-2 weeks. Call office for appointment.  If you are unable to travel home within the next 2 weeks you will need to follow up with a physician here in [place]. You can follow up at the [facility] medical clinic at [phone]  Secondary Diagnosis:	Dysphagia  Instructions for follow-up, activity and diet:	Follow prescribed diet with soft foods and nectar thickened fluids to prevent aspiration of food and fluids into the lungs. Coughing with intake may indicate aspiration.  Sit up during eating and drinking. Use a spoon and eat and drink slowly. Chew thoroughly.  If coughing persists or you develop shortness of breath, difficulty breathing or fever call your physician or seek medical care promptly Principal Discharge DX:	Subdural hematoma  Goal:	resolution of event  Instructions for follow-up, activity and diet:	You have had a small area of bleeding around your brain.  You need to protect yourself from trauma to your head.  If you hit your head you need to follow up with your physician or local hospital urgently.  If you experience headache, visual changes, confusion or change in awareness you need to be evaluated at the hospital urgently  You should not fly until you have completed the follow up Neurologist exam this week and been cleared by the Neurologist for airplane travel.  Secondary Diagnosis:	Syncope  Instructions for follow-up, activity and diet:	Continue medications as prescribed  Wearing compression stockings can help reduce the risk of decreased blood pressure and should be worn daily when out of bed.  Follow up with your Primary care Physician and Cardiologist within 1-2 weeks. Call office for appointment.  If you are unable to travel home within the next 2 weeks you will need to follow up with a physician here in [place]. You can follow up at the [facility] medical clinic at [phone]  Secondary Diagnosis:	Dysphagia  Instructions for follow-up, activity and diet:	Follow prescribed diet with soft foods and nectar thickened fluids to prevent aspiration of food and fluids into the lungs. Coughing with intake may indicate aspiration.  Sit up during eating and drinking. Use a spoon and eat and drink slowly. Chew thoroughly.  If coughing persists or you develop shortness of breath, difficulty breathing or fever call your physician or seek medical care promptly Principal Discharge DX:	Subdural hematoma  Goal:	resolution of event  Instructions for follow-up, activity and diet:	You have had a small area of bleeding around your brain.  You need to protect yourself from trauma to your head.  If you hit your head you need to follow up with your physician or local hospital urgently.  If you experience headache, visual changes, confusion or change in awareness you need to be evaluated at the hospital urgently  You should not fly until you have completed the follow up Neurologist exam this week and been cleared by the Neurologist for airplane travel.  Secondary Diagnosis:	Syncope  Instructions for follow-up, activity and diet:	Continue medications as prescribed  Wearing compression stockings can help reduce the risk of decreased blood pressure and should be worn daily when out of bed.  Follow up with your Primary care Physician and Cardiologist within 1-2 weeks. Call office for appointment.  If you are unable to travel home within the next 2 weeks you will need to follow up with a physician here in [place]. You can follow up at the [facility] medical clinic at [phone]  Secondary Diagnosis:	Dysphagia  Instructions for follow-up, activity and diet:	Follow prescribed diet with soft foods and nectar thickened fluids to prevent aspiration of food and fluids into the lungs. Coughing with intake may indicate aspiration.  Sit up during eating and drinking. Use a spoon and eat and drink slowly. Chew thoroughly.  If coughing persists or you develop shortness of breath, difficulty breathing or fever call your physician or seek medical care promptly Principal Discharge DX:	Subdural hematoma  Goal:	resolution of event  Instructions for follow-up, activity and diet:	You have had a small area of bleeding around your brain.  You need to protect yourself from trauma to your head.  If you hit your head you need to follow up with your physician or local hospital urgently.  If you experience headache, visual changes, confusion or change in awareness you need to be evaluated at the hospital urgently  You should not fly until you have completed the follow up Neurologist exam this week and been cleared by the Neurologist for airplane travel.  Secondary Diagnosis:	Syncope  Instructions for follow-up, activity and diet:	Continue medications as prescribed  Wearing compression stockings can help reduce the risk of decreased blood pressure and should be worn daily when out of bed.  Follow up with your Primary care Physician and Cardiologist within 1-2 weeks. Call office for appointment.  If you are unable to travel home within the next 2 weeks you will need to follow up with a physician here in [place]. You can follow up at the [facility] medical clinic at [phone]  Secondary Diagnosis:	Dysphagia  Instructions for follow-up, activity and diet:	Follow prescribed diet with soft foods and nectar thickened fluids to prevent aspiration of food and fluids into the lungs. Coughing with intake may indicate aspiration.  Sit up during eating and drinking. Use a spoon and eat and drink slowly. Chew thoroughly.  If coughing persists or you develop shortness of breath, difficulty breathing or fever call your physician or seek medical care promptly

## 2017-07-28 NOTE — DISCHARGE NOTE ADULT - CONDITIONS AT DISCHARGE
Patient is being discharged A&OX4, no c/o chest pain, SOB/MCGARRY, peripheral IV removed IV site free from any s/s of infection; VS stable.

## 2017-07-28 NOTE — PHYSICAL THERAPY INITIAL EVALUATION ADULT - ADDITIONAL COMMENTS
Pt lives with son in apartment, 4 steps to enter. Was independent ambulator without assistive device pta.  Pt can be home alone at times.

## 2017-07-28 NOTE — DISCHARGE NOTE ADULT - CARE PROVIDER_API CALL
Nissenbaum, Michael A (MD), Neurology  [address]  [place] [address]  Phone: [phone]  Fax: [phone]

## 2017-07-28 NOTE — SWALLOW BEDSIDE ASSESSMENT ADULT - ASR SWALLOW RECOMMEND DIAG
MD, PLEASE ENTER ORDER FOR MODIFIED BARIUM SWALLOW STUDY (ENTER UNDER RADIOLOGY EXAMS THE FOLLOWING WAY: GO TO THE BROWSER AND TYPE IN XRAY, THEN HIT THE SPACEBAR, AND TYPE IN THE LETTER M.)  WILL SCHEDULE EXAM UPON RECEIPT IN RADIOLOGY./VFSS/MBS son decline exam/VFSS/MBS VFSS/MBS/son declined exam

## 2017-07-28 NOTE — PHYSICAL THERAPY INITIAL EVALUATION ADULT - PERTINENT HX OF CURRENT PROBLEM, REHAB EVAL
88 y/oM admitted 7/26 s/p recurrent syncope at home. Recent d/c from hospital, CT of head in ER, resolution of subdural hematoma. As per recent medicinte note, pt is not orthostatic, awaiting EEG. PMH CAD s/p recent 4v CABG, HLD, Hypothyoid

## 2017-07-28 NOTE — SWALLOW BEDSIDE ASSESSMENT ADULT - ASR SWALLOW ASPIRATION MONITOR
change of breathing pattern/cough/gurgly voice/upper respiratory infection/fever/throat clearing/Monitor for s/s aspiration/laryngeal penetration. If noted:  D/C p.o. intake, provide non-oral nutrition/hydration/meds, and contact this service @ [phone]/pneumonia

## 2017-07-28 NOTE — DISCHARGE NOTE ADULT - MEDICATION SUMMARY - MEDICATIONS TO CHANGE
I will SWITCH the dose or number of times a day I take the medications listed below when I get home from the hospital:    levETIRAcetam 500 mg oral tablet  -- 1 tab(s) by mouth 2 times a day from 7/22 to 7/28/17

## 2017-07-28 NOTE — DISCHARGE NOTE ADULT - ADDITIONAL INSTRUCTIONS
Follow up with the recommended Neurologist within 1 week of discharge. Call office for an urgent appointment.  Follow up with your Primary MD or the hospital's medical clinic within 1-2 weeks of discharge  Do not engage in airline travel until cleared by the outpatient Neurologist

## 2017-07-28 NOTE — SWALLOW BEDSIDE ASSESSMENT ADULT - MODE OF PRESENTATION
self fed cup/pt only noted to take small single sips, despite recommendations for continual consumption./self fed straw

## 2017-07-28 NOTE — SWALLOW BEDSIDE ASSESSMENT ADULT - SLP GENERAL OBSERVATIONS
Pt alert, able to follow commands and answer questions. Pt alert, able to follow commands and answer questions. Pt bilingual - Setswana and English. Per pt and family, he does not want  phone. Pt verbose- providing extensive information re: multiple unrelated medical topics- required some redirection to task. Pt and daughter denied dysphagia; however, pt had multiple questions re: his most pressing compliant of "mucous", asking repeatedly, "why do I have so much? I never had this before. It stated 15 days ago." When questioned re: reports of "gagging" while taking p.o.  that were written in the H&P, pt denied. Daughter stated "it only happened once". Pt alert, able to follow commands and answer questions. Pt bilingual - Lithuanian and English. Per pt and family, he does not want  phone. Pt verbose- providing extensive information re: multiple unrelated medical topics- required some redirection to task. Pt and daughter denied dysphagia; however, pt had multiple questions re: his most pressing compliant of "mucous", asking repeatedly, "why do I have so much? I never had this before. It stated 15 days ago." When questioned re: reports of "gagging" while taking p.o.  that were written in the H&P, pt denied. Daughter stated "it only happened once". During later discussion with son he stated same.

## 2017-07-28 NOTE — DISCHARGE NOTE ADULT - PLAN OF CARE
resolution of event You have had a small area of bleeding around your brain.  You need to protect yourself from trauma to your head.  If you hit your head you need to follow up with your physician or local hospital urgently.  If you experience headache, visual changes, confusion or change in awareness you need to be evaluated at the hospital urgently  You should not fly until you have completed the follow up Neurologist exam this week and been cleared by the Neurologist for airplane travel. Continue medications as prescribed  Follow up with your Primary care Physician and Cardiologist within 1-2 weeks. Call office for appointment.  If you are unable to travel home within the next 2 weeks you will need to follow up with a physician here in [place]. You can follow up at the [facility] medical clinic at [phone] Follow prescribed diet with soft foods and nectar thickened fluids to prevent aspiration of food and fluids into the lungs. Coughing with intake may indicate aspiration.  Sit up during eating and drinking. Use a spoon and eat and drink slowly. Chew thoroughly.  If coughing persists or you develop shortness of breath, difficulty breathing or fever call your physician or seek medical care promptly Continue medications as prescribed  Wearing compression stockings can help reduce the risk of decreased blood pressure and should be worn daily when out of bed.  Follow up with your Primary care Physician and Cardiologist within 1-2 weeks. Call office for appointment.  If you are unable to travel home within the next 2 weeks you will need to follow up with a physician here in [place]. You can follow up at the [facility] medical clinic at [phone]

## 2017-07-28 NOTE — DISCHARGE NOTE ADULT - HOSPITAL COURSE
To be completed by Attending MD Pt DC after fall with SDH, Returned to ER with recurrent Episode of ?syncope.  Cardiac eval negative.  Neuro suggested possible seizure and increased AED.  DC in stable condition.  Will f/u cario and neuro upon return to home in [place].

## 2017-07-28 NOTE — PROGRESS NOTE ADULT - SUBJECTIVE AND OBJECTIVE BOX
- Patient seen and examined.  - In summary, patient is a 88y year old man who presented with syncope (2017 09:17)  - Today, patient is without complaints.         *****MEDICATIONS:    MEDICATIONS  (STANDING):  aspirin  chewable 81 milliGRAM(s) Oral daily  atorvastatin 20 milliGRAM(s) Oral at bedtime  bicalutamide 50 milliGRAM(s) Oral daily  metoprolol 25 milliGRAM(s) Oral two times a day  senna 2 Tablet(s) Oral at bedtime  dorzolamide 2% Ophthalmic Solution 1 Drop(s) Both EYES three times a day  latanoprost 0.005% Ophthalmic Solution 1 Drop(s) Both EYES at bedtime  levothyroxine 50 MICROGram(s) Oral daily  folic acid 1 milliGRAM(s) Oral daily  heparin  Injectable 5000 Unit(s) SubCutaneous every 12 hours  levETIRAcetam 750 milliGRAM(s) Oral two times a day    MEDICATIONS  (PRN):             ***** REVIEW OF SYSTEM:  GEN: no fever, no chills, no pain  RESP: no SOB, no cough, no sputum  CVS: no chest pain, no palpitations, no edema  GI: no abdominal pain, no nausea, no vomiting, no constipation, no diarrhea  : no dysurea, no frequency  NEURO: no headache, no diziness  PSYCH: no depression, not anxious  Derm : no itching, no rash         ***** VITAL SIGNS:    T(F): 98 (17 @ 05:06), Max: 98 (17 @ 05:06)  HR: 78 (17 @ 05:06) (78 - 99)  BP: 132/70 (17 @ 05:06) (132/70 - 150/76)  RR: 18 (17 @ 05:06) (18 - 18)  SpO2: 100% (17 @ 05:06) (94% - 100%)  Wt(kg): --  ,   I&O's Summary    2017 07:01  -  2017 07:00  --------------------------------------------------------  IN: 480 mL / OUT: 1600 mL / NET: -1120 mL                 *****PHYSICAL EXAM:  GEN: A&O X 3 , NAD , comfortable  HEENT: NCAT, EOMI, MMM, no icterus  NECK: Supple, No JVD  CVS: S1S2 , regular , No M/R/G appreciated  PULM: CTA B/L,  no W/R/R appreciated  ABD.: soft. non tender, non distended,  bowel sounds present  Extrem: intact pulses , no edema noted  Derm: No rash or ecchymosis noted  PSYCH: normal mood, no depression, not anxious         *****LAB AND IMAGIN.0   8.9   )-----------( 208      ( 2017 06:40 )             35.4               07-27    135  |  103  |  19  ----------------------------<  89  3.9   |  18<L>  |  0.86    Ca    9.3      2017 06:37             CARDIAC MARKERS ( 2017 10:26 )  x     / <0.01 ng/mL / 38 U/L / x     / 2.3 ng/mL        [All pertinent recent Imaging/Reports reviewed]         *****A S S E S S M E N T   A N D   P L A N :  88M CAD ?dysphagia, syncope vasovagal vs seizure, intermittent weakness, SDH  neuro eval noted  await EEG  cont keppra  S+S eval  PT re-eval, ?needs [facility]NER  cont home meds  tele for now  orthostatics  DCP pending neuro f/u        __________________________  DM Castillo D.O.

## 2017-07-28 NOTE — SWALLOW BEDSIDE ASSESSMENT ADULT - PHARYNGEAL PHASE
Within functional limits after back-to back trials, pt with s/s suggestive of pharyngea retention and then cleared throat multiple times stating "there is the mucous" - counseled pt and family that this may be a sign of aspiration/laryngeal penetration (that can potentially result in pulmonary complications) and advised diet change to Dysphagia III with nectar thick liquids and a modified barium swallow study for comprehensive assessment

## 2017-07-28 NOTE — EEG REPORT - NS EEG TEXT BOX
7/28/2017    Hx: Concern for Seizures    Study Interpretation:    FINDINGS:  The background was continuous, spontaneously variable and reactive.  During wakefulness, the posteriorly dominant rhythm consisted of symmetric, well modulated 7-8 Hz activity, with an amplitude to 40 uV, that attenuated to eye opening.  Low amplitude central beta was noted in wakefulness.    Sleep Background:  Drowsiness was characterized by fragmentation, attenuation, and slowing of the background activity.    Segments of stage II sleep were not recorded.    Background Slowing:  Generalized background slowing: Intermittent theta activity   Focal slowing: none was present.    Other Paroxysmal Non-Epileptiform Findings:    None.    Epileptiform Activity:   No epileptiform discharges were present.    Events:  No clinical events were recorded.  No seizures were recorded.    Activation Procedures:   -Hyperventilation was not performed.    -Photic stimulation was not performed.    Artifacts:  Intermittent myogenic and movement artifacts were noted.    ECG:  The heart rate on single channel ECG was predominantly between 70-80 BPM.    Compressed Spectral Array Digital Analysis    FINDINGS:  Compressed Spectral Array (CSA) data was reviewed separately and correlated with the electroencephalographic findings detailed above.  CSA showed a variable spectral pattern.  Areas of increased power in particular were reviewed in detail, and compared with the raw EEG data.  Areas of abrupt increases in spectral power were reviewed to exclude seizures, and were determined to be artifactual in nature.    The relative ratio of the power of delta range frequencies and faster frequencies remained stable over the course of the study.  There was no definitive increase in the relative power in the delta frequency spectrum apparent in the left hemisphere versus the right hemisphere.      Compressed Spectral Array (Digital Analysis) Summary/ Impression:  No persistent hemispheric asymmetry.  Intermittent areas of increased power reviewed, without definite epileptiform activity associated on CSA.      EEG Classification:  Abnormal study  -	Mild diffuse slowing    Impression:  Findings indicate non-specific mild diffuse or multifocal cerebral dysfunction. There were no epileptiform abnormalities recorded, which does not exclude the diagnosis of epilepsy.  Consider repeat study if clinically indicated.    	  Neil Anderson M.D.			    Attending Physician, [facility] [place]

## 2017-08-01 RX ORDER — LEVETIRACETAM 250 MG/1
1 TABLET, FILM COATED ORAL
Qty: 60 | Refills: 0 | OUTPATIENT
Start: 2017-08-01 | End: 2017-08-31

## 2018-02-08 RX ORDER — METOPROLOL TARTRATE 50 MG
1 TABLET ORAL
Qty: 0 | Refills: 0 | COMMUNITY

## 2018-02-08 RX ORDER — CLOPIDOGREL BISULFATE 75 MG/1
1 TABLET, FILM COATED ORAL
Qty: 0 | Refills: 0 | COMMUNITY

## 2018-02-08 RX ORDER — BICALUTAMIDE 50 MG/1
1 TABLET, FILM COATED ORAL
Qty: 0 | Refills: 0 | COMMUNITY

## 2018-02-08 RX ORDER — FOLIC ACID 0.8 MG
1 TABLET ORAL
Qty: 0 | Refills: 0 | COMMUNITY

## 2018-02-08 RX ORDER — ROSUVASTATIN CALCIUM 5 MG/1
1 TABLET ORAL
Qty: 0 | Refills: 0 | COMMUNITY

## 2018-02-08 RX ORDER — ASPIRIN/CALCIUM CARB/MAGNESIUM 324 MG
1 TABLET ORAL
Qty: 0 | Refills: 0 | COMMUNITY

## 2018-02-08 RX ORDER — POTASSIUM CHLORIDE 20 MEQ
1 PACKET (EA) ORAL
Qty: 0 | Refills: 0 | COMMUNITY

## 2018-02-08 RX ORDER — FLUTICASONE PROPIONATE 220 MCG
1 AEROSOL WITH ADAPTER (GRAM) INHALATION
Qty: 0 | Refills: 0 | COMMUNITY

## 2018-02-08 RX ORDER — LEVOTHYROXINE SODIUM 125 MCG
1 TABLET ORAL
Qty: 0 | Refills: 0 | COMMUNITY

## 2018-02-08 RX ORDER — ESOMEPRAZOLE MAGNESIUM 40 MG/1
1 CAPSULE, DELAYED RELEASE ORAL
Qty: 0 | Refills: 0 | COMMUNITY

## 2018-02-08 RX ORDER — FUROSEMIDE 40 MG
1 TABLET ORAL
Qty: 0 | Refills: 0 | COMMUNITY

## 2018-02-08 RX ORDER — BRINZOLAMIDE 10 MG/ML
1 SUSPENSION/ DROPS OPHTHALMIC
Qty: 0 | Refills: 0 | COMMUNITY

## 2018-02-16 NOTE — PROGRESS NOTE ADULT - SUBJECTIVE AND OBJECTIVE BOX
SUBJECTIVE / OVERNIGHT EVENTS: No nausea, vomiting or diarrhea, no fever or chills, no dizziness or chest pain, no dysuria or hematuria .      CAPILLARY BLOOD GLUCOSE        I&O's Summary    27 Jul 2017 07:01  -  28 Jul 2017 07:00  --------------------------------------------------------  IN: 480 mL / OUT: 1600 mL / NET: -1120 mL        PHYSICAL EXAM:  HEAD:  Atraumatic, Normocephalic  EYES: EOMI, PERRLA, conjunctiva and sclera clear  NECK: Supple, No JVD  CHEST/LUNG: Clear to auscultation bilaterally; No wheeze  HEART: Regular rate and rhythm; No murmurs, rubs, or gallops  ABDOMEN: Soft, Nontender, Nondistended; Bowel sounds present  EXTREMITIES:  2+ Peripheral Pulses, No clubbing, cyanosis, or edema  PSYCH: AAOx3  NEUROLOGY: non-focal  SKIN: No rashes or lesions    MEDICATIONS  (STANDING):  aspirin  chewable 81 milliGRAM(s) Oral daily  atorvastatin 20 milliGRAM(s) Oral at bedtime  bicalutamide 50 milliGRAM(s) Oral daily  metoprolol 25 milliGRAM(s) Oral two times a day  senna 2 Tablet(s) Oral at bedtime  dorzolamide 2% Ophthalmic Solution 1 Drop(s) Both EYES three times a day  latanoprost 0.005% Ophthalmic Solution 1 Drop(s) Both EYES at bedtime  levothyroxine 50 MICROGram(s) Oral daily  folic acid 1 milliGRAM(s) Oral daily  heparin  Injectable 5000 Unit(s) SubCutaneous every 12 hours  levETIRAcetam 750 milliGRAM(s) Oral two times a day    MEDICATIONS  (PRN):      LABS:                        12.0   8.9   )-----------( 208      ( 27 Jul 2017 06:40 )             35.4     07-27    135  |  103  |  19  ----------------------------<  89  3.9   |  18<L>  |  0.86    Ca    9.3      27 Jul 2017 06:37        CARDIAC MARKERS ( 26 Jul 2017 10:26 )  x     / <0.01 ng/mL / 38 U/L / x     / 2.3 ng/mL                Thyroid Stimulating Hormone, Serum: 0.32 uIU/mL (07-23 @ 08:42)      Cultures:    EKG:    Radiological Studies:    Consultant(s) Notes Reviewed:      Care Discussed with Consultants/Other Providers: H/O stress fracture

## 2021-02-18 NOTE — ED PROVIDER NOTE - MEDICAL DECISION MAKING DETAILS
This note was copied from a baby's chart  Mom called for latch assessment  Mom had baby in the football hold, but it appeared to be a shallow latch  I had mom break the seal to take baby off breast  I then demo  to her how to aim the nipple towards baby's nose and how to bring her on the breast, from under the nipple to get a deeper latch  Baby latched well with audible swallows  Steffen Mcghee DO; see attending attestation

## 2022-01-13 NOTE — ED ADULT NURSE NOTE - FALLEN IN THE PAST
Show Applicator Variable?: Yes Duration Of Freeze Thaw-Cycle (Seconds): 0 Post-Care Instructions: I reviewed with the patient in detail post-care instructions. Patient is to wear sunprotection, and avoid picking at any of the treated lesions. Pt may apply Vaseline to crusted or scabbing areas. Render Post-Care Instructions In Note?: no Detail Level: Detailed Consent: The patient's consent was obtained including but not limited to risks of crusting, scabbing, blistering, scarring, darker or lighter pigmentary change, recurrence, incomplete removal and infection. Medical Necessity Clause: This procedure was medically necessary because the lesions that were treated were: Spray Paint Text: The liquid nitrogen was applied to the skin utilizing a spray paint frosting technique. Medical Necessity Information: It is in your best interest to select a reason for this procedure from the list below. All of these items fulfill various CMS LCD requirements except the new and changing color options. yes

## 2023-01-26 NOTE — ED PROVIDER NOTE - CRITICAL CARE INDICATION, MLM
Hide Include Location In Plan Question?: No Detail Level: Simple patient was critically ill... Patient was critically ill with a high probability of imminent or life threatening deterioration.

## 2024-12-09 NOTE — ED ADULT NURSE NOTE - CAS EDN DISCHARGE ASSESSMENT
Scheduled an appt for patient for 12-.  LVM that if the date and time didn't work to call back and I will reschedule (Tab).  Hub can read   Alert and oriented to person, place and time

## 2025-06-03 NOTE — ED ADULT NURSE REASSESSMENT NOTE - PAIN ACTIVITY
Orders:    fluticasone propion-salmeteroL (Advair HFA) 115-21 mcg/actuation inhaler; Inhale 2 puffs 2 times a day. Rinse mouth with water after use to reduce aftertaste and incidence of candidiasis. Do not swallow.     0